# Patient Record
Sex: FEMALE | Race: WHITE | Employment: FULL TIME | URBAN - METROPOLITAN AREA
[De-identification: names, ages, dates, MRNs, and addresses within clinical notes are randomized per-mention and may not be internally consistent; named-entity substitution may affect disease eponyms.]

---

## 2017-04-18 ENCOUNTER — TRANSFERRED RECORDS (OUTPATIENT)
Dept: FAMILY MEDICINE | Facility: CLINIC | Age: 52
End: 2017-04-18

## 2017-06-17 ENCOUNTER — HEALTH MAINTENANCE LETTER (OUTPATIENT)
Age: 52
End: 2017-06-17

## 2018-01-29 ENCOUNTER — HOSPITAL ENCOUNTER (OUTPATIENT)
Dept: MAMMOGRAPHY | Facility: CLINIC | Age: 53
Discharge: HOME | End: 2018-01-29
Attending: OBSTETRICS & GYNECOLOGY | Admitting: OBSTETRICS & GYNECOLOGY
Payer: COMMERCIAL

## 2018-01-29 DIAGNOSIS — Z12.31 VISIT FOR SCREENING MAMMOGRAM: ICD-10-CM

## 2018-01-29 PROCEDURE — 77063 BREAST TOMOSYNTHESIS BI: CPT

## 2018-05-08 ENCOUNTER — TRANSFERRED RECORDS (OUTPATIENT)
Dept: FAMILY MEDICINE | Facility: CLINIC | Age: 53
End: 2018-05-08

## 2018-05-08 LAB
HBA1C MFR BLD: 5.5 % (ref 4–6)
T4 FREE SERPL-MCNC: 1.37 NG/DL (ref 0.71–1.85)
TSH SERPL-ACNC: 2.51 MCU/ML (ref 0.3–5)

## 2018-05-31 ENCOUNTER — OFFICE VISIT (OUTPATIENT)
Dept: FAMILY MEDICINE | Facility: CLINIC | Age: 53
End: 2018-05-31

## 2018-05-31 VITALS
OXYGEN SATURATION: 99 % | DIASTOLIC BLOOD PRESSURE: 62 MMHG | HEART RATE: 65 BPM | SYSTOLIC BLOOD PRESSURE: 108 MMHG | WEIGHT: 184 LBS | TEMPERATURE: 98.2 F | HEIGHT: 70 IN | BODY MASS INDEX: 26.34 KG/M2

## 2018-05-31 DIAGNOSIS — M54.12 CERVICAL RADICULOPATHY: Primary | ICD-10-CM

## 2018-05-31 PROBLEM — Z76.89 HEALTH CARE HOME: Status: ACTIVE | Noted: 2018-05-31

## 2018-05-31 PROBLEM — D68.51 FACTOR V LEIDEN MUTATION (H): Status: ACTIVE | Noted: 2018-05-31

## 2018-05-31 PROBLEM — E03.9 ACQUIRED HYPOTHYROIDISM: Status: ACTIVE | Noted: 2018-05-31

## 2018-05-31 PROCEDURE — 99213 OFFICE O/P EST LOW 20 MIN: CPT | Performed by: PHYSICIAN ASSISTANT

## 2018-05-31 RX ORDER — METHYLPREDNISOLONE 4 MG
TABLET, DOSE PACK ORAL
Qty: 21 TABLET | Refills: 0 | Status: SHIPPED | OUTPATIENT
Start: 2018-05-31 | End: 2019-10-22

## 2018-05-31 RX ORDER — LEVOTHYROXINE SODIUM 112 UG/1
TABLET ORAL
Refills: 4 | COMMUNITY
Start: 2018-05-18 | End: 2021-05-04

## 2018-05-31 NOTE — NURSING NOTE
Kristal is here for pt states she has a pinched nerve in her neck that began yesterday morning    Pre-Visit Screening :  Immunizations : up to date    Colonoscopy : is up to date  Mammogram : is up to date  Asthma Action Test/Plan : yana  PHQ9/GAD7 :  Na    Pulse - regular  My Chart - accepts    CLASSIFICATION OF OVERWEIGHT AND OBESITY BY BMI                         Obesity Class           BMI(kg/m2)  Underweight                                    < 18.5  Normal                                         18.5-24.9  Overweight                                     25.0-29.9  OBESITY                     I                  30.0-34.9                              II                 35.0-39.9  EXTREME OBESITY             III                >40                             Patient's  BMI Body mass index is 22.15 kg/(m^2).  http://hin.nhlbi.nih.gov/menuplanner/menu.cgi  Questioned patient about current smoking habits.  Pt. has never smoked.  The patient has verbalized that it is ok to leave a detailed voice message on the patient's cell phone with results/recommendations from this visit.       Verified [phone]  phone number:

## 2018-05-31 NOTE — MR AVS SNAPSHOT
"              After Visit Summary   5/31/2018    Kristal Nicole    MRN: [id number]           Patient Information     Date Of Birth          1965        Visit Information        Provider Department      5/31/2018 4:00 PM Jada Dyer PA [facility]        Today's Diagnoses     Cervical radiculopathy    -  1       Follow-ups after your visit        Who to contact     If you have questions or need follow up information about today's clinic visit or your schedule please contact [facility] directly at [phone].  Normal or non-critical lab and imaging results will be communicated to you by Sustainationhart, letter or phone within 4 business days after the clinic has received the results. If you do not hear from us within 7 days, please contact the clinic through Gen9t or phone. If you have a critical or abnormal lab result, we will notify you by phone as soon as possible.  Submit refill requests through XGear or call your pharmacy and they will forward the refill request to us. Please allow 3 business days for your refill to be completed.          Additional Information About Your Visit        MyChart Information     XGear gives you secure access to your electronic health record. If you see a primary care provider, you can also send messages to your care team and make appointments. If you have questions, please call your primary care clinic.  If you do not have a primary care provider, please call [phone] and they will assist you.        Care EveryWhere ID     This is your Care EveryWhere ID. This could be used by other organizations to access your [facility] medical records  [id number]        Your Vitals Were     Pulse Temperature Height Pulse Oximetry Breastfeeding? BMI (Body Mass Index)    65 98.2  F (36.8  C) (Oral) 1.778 m (5' 10\") 99% No 26.4 kg/m2       Blood Pressure from Last 3 Encounters:   05/31/18 108/62   06/23/08 100/64   05/08/07 " 110/72    Weight from Last 3 Encounters:   05/31/18 83.5 kg (184 lb)   06/23/08 86.2 kg (190 lb)   05/08/07 83.5 kg (184 lb)              Today, you had the following     No orders found for display         Today's Medication Changes          These changes are accurate as of 5/31/18  5:35 PM.  If you have any questions, ask your nurse or doctor.               Start taking these medicines.        Dose/Directions    methylPREDNISolone 4 MG tablet   Commonly known as:  MEDROL DOSEPAK   Used for:  Cervical radiculopathy   Started by:  Jada Dyer PA        Follow package instructions   Quantity:  21 tablet   Refills:  0            Where to get your medicines      These medications were sent to [place] [address] [place] [facility] - [address] [address]     Phone:  [phone]     methylPREDNISolone 4 MG tablet                Primary Care Provider Office Phone # Fax #    STEPHAN Guzman [phone] [address] [facility] [address]        Equal Access to Services     [facility] Center[facility]CILIA : Hadii schuyler ku hadasho Soomaali, waaxda luqadaha, qaybta kaalmada adecarol, eli sparrow . So [facility] [phone].    ATENCIÓN: Si habla español, tiene a romero disposición servicios gratuitos de asistencia lingüística. June al [phone].    We comply with applicable federal civil rights laws and [place] laws. We do not discriminate on the basis of race, color, national origin, age, disability, sex, sexual orientation, or gender identity.            Thank you!     Thank you for choosing [facility]  for your care. Our goal is always to provide you with excellent care. Hearing back from our patients is one way we can continue to improve our services. Please take a few minutes to complete the written survey that you may receive in the mail after your visit with us. Thank you!             Your  Updated Medication List - Protect others around you: Learn how to safely use, store and throw away your medicines at www.disposemymeds.org.          This list is accurate as of 5/31/18  5:35 PM.  Always use your most recent med list.                   Brand Name Dispense Instructions for use Diagnosis    levothyroxine 112 MCG tablet    SYNTHROID/LEVOTHROID     TAKE ONE TABLET BY MOUTH ONE TIME DAILY FOR 90 DAYS        methylPREDNISolone 4 MG tablet    MEDROL DOSEPAK    21 tablet    Follow package instructions    Cervical radiculopathy

## 2018-06-01 ENCOUNTER — TELEPHONE (OUTPATIENT)
Dept: FAMILY MEDICINE | Facility: CLINIC | Age: 53
End: 2018-06-01

## 2018-06-01 DIAGNOSIS — M54.12 CERVICAL RADICULOPATHY: Primary | ICD-10-CM

## 2018-06-01 RX ORDER — HYDROCODONE BITARTRATE AND ACETAMINOPHEN 5; 325 MG/1; MG/1
1 TABLET ORAL EVERY 4 HOURS PRN
Qty: 20 TABLET | Refills: 0 | Status: SHIPPED | OUTPATIENT
Start: 2018-06-01 | End: 2018-06-09

## 2018-06-01 NOTE — TELEPHONE ENCOUNTER
Medical records request to [facility].. Faxed 6/1/18. Waiting on records.

## 2018-06-01 NOTE — TELEPHONE ENCOUNTER
Ok FOR #20 Jose Alfredo  I reviewed the patient information handout from Up To Date on this medication including side effects with the patient.    Call me on Monday with update -   MRI on Monday if not improving.     Paul will  rx    Jada Dyer PA-C  6/1/2018

## 2018-06-04 ENCOUNTER — TELEPHONE (OUTPATIENT)
Dept: FAMILY MEDICINE | Facility: CLINIC | Age: 53
End: 2018-06-04

## 2018-06-04 DIAGNOSIS — M54.12 CERVICAL RADICULOPATHY: Primary | ICD-10-CM

## 2018-06-04 NOTE — TELEPHONE ENCOUNTER
Kristal called clinic support stating that she was told to follow up with Jada about her pinched nerve and she states that she isn't doing very well and would like a call back.    Pt can be reached at [phone]    Please advise

## 2018-06-05 RX ORDER — METHYLPREDNISOLONE 4 MG
TABLET, DOSE PACK ORAL
Qty: 21 TABLET | Refills: 0 | Status: SHIPPED | OUTPATIENT
Start: 2018-06-05 | End: 2019-10-22

## 2018-06-05 NOTE — TELEPHONE ENCOUNTER
Took all pills at once instead of following directions    Sat - did a lot of cleaning b/c she was feeling great.    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    Took last pill this am.  Taking fish oil  Took pain pill this am.         ADVISE NO WORKING/LIFTING.  It will not heal if she doesn't rest area.    New Medrol - FOLLOW INSTRUCTIONS    If not improving with this needs MRI    Jada Dyer PA-C  6/5/2018

## 2018-06-08 ENCOUNTER — TRANSFERRED RECORDS (OUTPATIENT)
Dept: FAMILY MEDICINE | Facility: CLINIC | Age: 53
End: 2018-06-08

## 2018-06-08 DIAGNOSIS — M54.12 CERVICAL RADICULOPATHY: ICD-10-CM

## 2018-06-08 RX ORDER — HYDROCODONE BITARTRATE AND ACETAMINOPHEN 5; 325 MG/1; MG/1
1 TABLET ORAL EVERY 4 HOURS PRN
Qty: 20 TABLET | Refills: 0 | OUTPATIENT
Start: 2018-06-08

## 2018-06-08 NOTE — TELEPHONE ENCOUNTER
Kristal is calling for more pain meds as she states she still needs it especially at night.  Last seen 5/2018    Pending Prescriptions:                       Disp   Refills    HYDROcodone-acetaminophen (NORCO) 5-325 M*20 tab*0            Sig: Take 1 tablet by mouth every 4 hours as needed           for pain    Patient's phone #[phone]

## 2018-06-08 NOTE — TELEPHONE ENCOUNTER
Denied - please tell pt she needs to be seen in clinic - have her see someone today or have her see Dr. Perez tomorrow.    Jada Dyer PA-C  6/8/2018

## 2018-06-09 ENCOUNTER — OFFICE VISIT (OUTPATIENT)
Dept: FAMILY MEDICINE | Facility: CLINIC | Age: 53
End: 2018-06-09

## 2018-06-09 VITALS
WEIGHT: 184 LBS | OXYGEN SATURATION: 99 % | HEART RATE: 66 BPM | SYSTOLIC BLOOD PRESSURE: 124 MMHG | TEMPERATURE: 98.7 F | BODY MASS INDEX: 26.34 KG/M2 | HEIGHT: 70 IN | DIASTOLIC BLOOD PRESSURE: 86 MMHG

## 2018-06-09 DIAGNOSIS — M54.12 CERVICAL RADICULOPATHY: ICD-10-CM

## 2018-06-09 PROCEDURE — 99213 OFFICE O/P EST LOW 20 MIN: CPT | Performed by: FAMILY MEDICINE

## 2018-06-09 RX ORDER — HYDROCODONE BITARTRATE AND ACETAMINOPHEN 5; 325 MG/1; MG/1
1 TABLET ORAL EVERY 4 HOURS PRN
Qty: 20 TABLET | Refills: 0 | Status: SHIPPED | OUTPATIENT
Start: 2018-06-09 | End: 2019-10-22

## 2018-06-09 NOTE — NURSING NOTE
Kristal is here for pinched nerve, pt has been having a hard time sleeping, has no improvement.      Pre-Visit Screening :  Immunizations : up to date  Colonoscopy : is up to date  Mammogram : is up to date  Asthma Action Test/Plan : yana  PHQ9/GAD7 :  Na    Pulse - regular  My Chart - accepts    CLASSIFICATION OF OVERWEIGHT AND OBESITY BY BMI                         Obesity Class           BMI(kg/m2)  Underweight                                    < 18.5  Normal                                         18.5-24.9  Overweight                                     25.0-29.9  OBESITY                     I                  30.0-34.9                              II                 35.0-39.9  EXTREME OBESITY             III                >40                             Patient's  BMI Body mass index is 22.15 kg/(m^2).  http://hin.nhlbi.nih.gov/menuplanner/menu.cgi  Questioned patient about current smoking habits.  Pt. quit smoking some time ago.    The patient has verbalized that it is ok to leave a detailed voice message on the patient's cell phone with results/recommendations from this visit.     Verified [phone]  phone number:

## 2018-06-09 NOTE — PROGRESS NOTES
"SUBJECTIVE:   Kristal Nicole is a 52 year old female who complains of neck pain for 17 days. Saw chiro initially for left neck pain and was told had \"rib out\"-adjusted but then pain moved to other (right)side. She then had some pain down right arm-was seen here and started on Medrol pack, iced. Pt also prescribed vicodin which helped some.  She then returned to activities and caused symptoms to worsen again.  Pt has been trying to avoid exacerbating pain this week but not going away, vicodin only takes the edge off. The pain is slightly positional with movement of neck with radiation of pain down the right arm. Mechanism of injury: NKI.  Symptoms have been unchanged since that time. Prior history of neck problems: recurrent self limited episodes of neck pain in the past. There is numbness, tingling, weakness in the right arm.    Patient Active Problem List   Diagnosis     CARDIOVASCULAR SCREENING; LDL GOAL LESS THAN 160     Health Care Home     Acquired hypothyroidism     Factor V Leiden mutation (H) homozygous     Past Medical History:   Diagnosis Date     NONSPECIFIC MEDICAL HISTORY     , given up for adoption     Family History   Problem Relation Age of Onset     Breast Cancer Mother 56     Cardiovascular Mother       blood clot leg after prolonged bedrest     Osteoarthritis Mother      hip     Cardiovascular Father      blood clots, legs     CANCER Father      skin      Other - See Comments Brother      Blood Clot - Factor V     Social History     Social History     Marital status:      Spouse name: Dhaval     Number of children: 0     Years of education: N/A     Occupational History      [employer]     Social History Main Topics     Smoking status: Former Smoker     Packs/day: 0.75     Years: 10.00     Quit date: 2000     Smokeless tobacco: Never Used     Alcohol use 1.8 oz/week     3 Standard drinks or equivalent per week     Drug use: No     Sexual activity: Yes     Partners: Male     " "Birth control/ protection: IUD     Other Topics Concern      Service No     Blood Transfusions No     Caffeine Concern Yes     few cups/day coffee and tea     Special Diet No     calcium daily, monitoring, dairy per day 2-3     Exercise Yes     swim, pilates, walking, 3day walk training     Bike Helmet No     Seat Belt Yes     Self-Exams Yes     Social History Narrative     Past Surgical History:   Procedure Laterality Date     D & C  6/09    miscarriage       Current Outpatient Prescriptions on File Prior to Visit:  HYDROcodone-acetaminophen (NORCO) 5-325 MG per tablet Take 1 tablet by mouth every 4 hours as needed for pain   levothyroxine (SYNTHROID/LEVOTHROID) 112 MCG tablet TAKE ONE TABLET BY MOUTH ONE TIME DAILY FOR 90 DAYS   methylPREDNISolone (MEDROL DOSEPAK) 4 MG tablet Follow package instructions   methylPREDNISolone (MEDROL DOSEPAK) 4 MG tablet Follow package instructions     No current facility-administered medications on file prior to visit.      Allergies: Penicillin [penicillins]    Immunization History   Administered Date(s) Administered     TDAP Vaccine (Adacel) 05/08/2007         OBJECTIVE:  Vital signs as noted above. Patient appears to be in mild to moderate pain.   Neck exam: no tenderness over spinous processes, right tenderness over lower cervical spine and nuchal area.Equivocal Spurlings test on left causing symptoms to right UE    Pt with relative weakness to right   and wrist flexion/extension compared to left  X-Ray: MRI ordered.    ASSESSMENT:   Right cervical radiculopathy with equivocal Spurlings test-minimal response to steroids, nsaids, rest-using vicodin to \"take the edge off\"    PLAN:ok for one further vicodin refill, I reviewed the risks, benefits, and possible side effects of the medication.  The patient had an opportunity to ask any questions regarding the treatment plan. The patient was encouraged to call my office if any problems.   MRI obtained  Consider Physical " Therapy and spine eval if not improving.   Call or return to clinic prn if these symptoms worsen or fail to improve as anticipated.

## 2018-06-09 NOTE — MR AVS SNAPSHOT
After Visit Summary   6/9/2018    Kristal Nicole    MRN: [id number]           Patient Information     Date Of Birth          1965        Visit Information        Provider Department      6/9/2018 10:00 AM David Perez MD [facility]        Today's Diagnoses     Cervical radiculopathy           Follow-ups after your visit        Additional Services     RADIOLOGY REFERRAL       Your provider has referred you to: N: [facility] [place] [phone]   [email or web address]    Please be aware that coverage of these services is subject to the terms and limitations of your health insurance plan.  Call member services at your health plan with any benefit or coverage questions.      Please bring the following to your appointment:    >>   Any x-rays, CTs or MRIs which have been performed.  Contact the facility where they were done to arrange for  prior to your scheduled appointment.    >>   List of current medications   >>   This referral request   >>   Any documents/labs given to you for this referral    Prior authorization is required for MRI/MRA, CT, Dexa Scans and Worker's Compensation cases.                  Future tests that were ordered for you today     Open Future Orders        Priority Expected Expires Ordered    MR Cervical Spine w/o Contrast Routine  6/9/2019 6/9/2018            Who to contact     If you have questions or need follow up information about today's clinic visit or your schedule please contact [facility] directly at [phone].  Normal or non-critical lab and imaging results will be communicated to you by MyChart, letter or phone within 4 business days after the clinic has received the results. If you do not hear from us within 7 days, please contact the clinic through MyChart or phone. If you have a critical or abnormal lab result, we will notify you by phone as soon as possible.  Submit refill  "requests through ElementsLocal or call your pharmacy and they will forward the refill request to us. Please allow 3 business days for your refill to be completed.          Additional Information About Your Visit        CoffeeTablehart Information     ElementsLocal gives you secure access to your electronic health record. If you see a primary care provider, you can also send messages to your care team and make appointments. If you have questions, please call your primary care clinic.  If you do not have a primary care provider, please call [phone] and they will assist you.        Care EveryWhere ID     This is your Care EveryWhere ID. This could be used by other organizations to access your [facility] medical records  [id number]        Your Vitals Were     Pulse Temperature Height Pulse Oximetry Breastfeeding? BMI (Body Mass Index)    66 98.7  F (37.1  C) (Oral) 1.778 m (5' 10\") 99% No 26.4 kg/m2       Blood Pressure from Last 3 Encounters:   06/09/18 124/86   05/31/18 108/62   06/23/08 100/64    Weight from Last 3 Encounters:   06/09/18 83.5 kg (184 lb)   05/31/18 83.5 kg (184 lb)   06/23/08 86.2 kg (190 lb)              We Performed the Following     RADIOLOGY REFERRAL          Where to get your medicines      Some of these will need a paper prescription and others can be bought over the counter.  Ask your nurse if you have questions.     Bring a paper prescription for each of these medications     HYDROcodone-acetaminophen 5-325 MG per tablet         Information about OPIOIDS     PRESCRIPTION OPIOIDS: WHAT YOU NEED TO KNOW   You have a prescription for an opioid (narcotic) pain medicine. Opioids can cause addiction. If you have a history of chemical dependency of any type, you are at a higher risk of becoming addicted to opioids. Only take this medicine after all other options have been tried. Take it for as short a time and as few doses as possible.     Do not:    Drive. If you drive while taking these medicines, you could be " arrested for driving under the influence (DUI).    Operate heavy machinery    Do any other dangerous activities while taking these medicines.     Drink any alcohol while taking these medicines.      Take with any other medicines that contain acetaminophen. Read all labels carefully. Look for the word  acetaminophen  or  Tylenol.  Ask your pharmacist if you have questions or are unsure.    Store your pills in a secure place, locked if possible. We will not replace any lost or stolen medicine. If you don t finish your medicine, please throw away (dispose) as directed by your pharmacist. The [place] Pollution Control Agency has more information about safe disposal: https://www.pca.[facility].mn.us/living-green/managing-unwanted-medications    All opioids tend to cause constipation. Drink plenty of water and eat foods that have a lot of fiber, such as fruits, vegetables, prune juice, apple juice and high-fiber cereal. Take a laxative (Miralax, milk of magnesia, Colace, Senna) if you don t move your bowels at least every other day.          Primary Care Provider Office Phone # Fax #    STEPHAN Guzman [phone]       [address]  [facility] [address]        Equal Access to Services     ZAIN JACKSON : Hadii aad ku hadasho Soomaali, waaxda luqadaha, qaybta kaalmada adeegyada, eli morris. So [facility] [phone].    ATENCIÓN: Si habla español, tiene a romero disposición servicios gratuitos de asistencia lingüística. June al [phone].    We comply with applicable federal civil rights laws and [place] laws. We do not discriminate on the basis of race, color, national origin, age, disability, sex, sexual orientation, or gender identity.            Thank you!     Thank you for choosing [facility]  for your care. Our goal is always to provide you with excellent care. Hearing back from our patients is one way we can continue to improve our  services. Please take a few minutes to complete the written survey that you may receive in the mail after your visit with us. Thank you!             Your Updated Medication List - Protect others around you: Learn how to safely use, store and throw away your medicines at www.disposemymeds.org.          This list is accurate as of 6/9/18 11:40 AM.  Always use your most recent med list.                   Brand Name Dispense Instructions for use Diagnosis    HYDROcodone-acetaminophen 5-325 MG per tablet    NORCO    20 tablet    Take 1 tablet by mouth every 4 hours as needed for pain    Cervical radiculopathy       levothyroxine 112 MCG tablet    SYNTHROID/LEVOTHROID     TAKE ONE TABLET BY MOUTH ONE TIME DAILY FOR 90 DAYS        * methylPREDNISolone 4 MG tablet    MEDROL DOSEPAK    21 tablet    Follow package instructions    Cervical radiculopathy       * methylPREDNISolone 4 MG tablet    MEDROL DOSEPAK    21 tablet    Follow package instructions    Cervical radiculopathy       * Notice:  This list has 2 medication(s) that are the same as other medications prescribed for you. Read the directions carefully, and ask your doctor or other care provider to review them with you.

## 2018-06-12 PROBLEM — Z86.0100 HISTORY OF COLONIC POLYPS: Status: ACTIVE | Noted: 2018-06-12

## 2018-06-18 ENCOUNTER — TRANSFERRED RECORDS (OUTPATIENT)
Dept: FAMILY MEDICINE | Facility: CLINIC | Age: 53
End: 2018-06-18

## 2018-06-20 ENCOUNTER — TELEPHONE (OUTPATIENT)
Dept: FAMILY MEDICINE | Facility: CLINIC | Age: 53
End: 2018-06-20

## 2018-06-20 NOTE — TELEPHONE ENCOUNTER
D/w pt MRI results- no clear nerve impingements but pt states she is no better-cant feel hand, losing strength    Recommend she see spine    She wants to call around and see how much it will cost    texted her [facility] and [facility] numbers

## 2018-06-20 NOTE — TELEPHONE ENCOUNTER
Kristal called clinic support regarding her recent MRI results.  Pt states that she cant access her mychart and wasn't able to see her results.  I read the letter and what was recommended for her and the area to focus on.   Pt still has questions and is wanting a direct referral of who to call and where to go.  States that she cant be a wife, mother, and that this has been debilitating and she is just looking for exactly who to see and what to do.    Dr Perez can you please call pt with who to see and advise? Phone number [phone]    Thank you

## 2018-07-10 ENCOUNTER — TRANSFERRED RECORDS (OUTPATIENT)
Dept: FAMILY MEDICINE | Facility: CLINIC | Age: 53
End: 2018-07-10

## 2019-10-01 ENCOUNTER — HEALTH MAINTENANCE LETTER (OUTPATIENT)
Age: 54
End: 2019-10-01

## 2019-10-22 ENCOUNTER — OFFICE VISIT (OUTPATIENT)
Dept: FAMILY MEDICINE | Facility: CLINIC | Age: 54
End: 2019-10-22

## 2019-10-22 VITALS
HEART RATE: 62 BPM | DIASTOLIC BLOOD PRESSURE: 68 MMHG | BODY MASS INDEX: 24.97 KG/M2 | WEIGHT: 174 LBS | TEMPERATURE: 98.5 F | SYSTOLIC BLOOD PRESSURE: 118 MMHG | OXYGEN SATURATION: 97 %

## 2019-10-22 DIAGNOSIS — M54.6 ACUTE LEFT-SIDED THORACIC BACK PAIN: Primary | ICD-10-CM

## 2019-10-22 LAB
ALBUMIN (URINE): ABNORMAL MG/DL
APPEARANCE UR: ABNORMAL
BACTERIA, UR: ABNORMAL
BILIRUB UR QL: ABNORMAL
BUN SERPL-MCNC: 12 MG/DL (ref 7–25)
BUN/CREATININE RATIO: 15.6 (ref 6–22)
CALCIUM SERPL-MCNC: 9.4 MG/DL (ref 8.6–10.3)
CASTS/LPF: ABNORMAL
CHLORIDE SERPLBLD-SCNC: 100.4 MMOL/L (ref 98–110)
CO2 SERPL-SCNC: 28.7 MMOL/L (ref 20–32)
COLOR UR: YELLOW
CREAT SERPL-MCNC: 0.77 MG/DL (ref 0.7–1.18)
EP/HPF: ABNORMAL
ERYTHROCYTE [DISTWIDTH] IN BLOOD BY AUTOMATED COUNT: 11.7 %
GLUCOSE SERPL-MCNC: 155 MG/DL (ref 60–99)
GLUCOSE URINE: ABNORMAL MG/DL
HCT VFR BLD AUTO: 43.3 % (ref 35–47)
HEMOGLOBIN: 14.1 G/DL (ref 11.7–15.7)
HGB UR QL: ABNORMAL
KETONES UR QL: ABNORMAL MG/DL
LEUKOCYTE ESTERASE - QUEST: ABNORMAL
MCH RBC QN AUTO: 32.3 PG (ref 26–33)
MCHC RBC AUTO-ENTMCNC: 32.6 G/DL (ref 31–36)
MCV RBC AUTO: 99.1 FL (ref 78–100)
MISC.: ABNORMAL
NITRITE UR QL STRIP: ABNORMAL
PH UR STRIP: 7 PH (ref 5–7)
PLATELET COUNT - QUEST: 182 10^9/L (ref 150–375)
POTASSIUM SERPL-SCNC: 4.62 MMOL/L (ref 3.5–5.3)
RBC # BLD AUTO: 4.37 10*12/L (ref 3.8–5.2)
RBC, UR MICRO: ABNORMAL (ref ?–2)
SODIUM SERPL-SCNC: 135.9 MMOL/L (ref 135–146)
SP. GRAVITY: 1.02
UROBILINOGEN UR QL STRIP: 0.2 EU/DL (ref 0.2–1)
WBC # BLD AUTO: 5.6 10*9/L (ref 4–11)
WBC, UR MICRO: ABNORMAL (ref ?–2)

## 2019-10-22 PROCEDURE — 85027 COMPLETE CBC AUTOMATED: CPT | Performed by: PHYSICIAN ASSISTANT

## 2019-10-22 PROCEDURE — 99213 OFFICE O/P EST LOW 20 MIN: CPT | Performed by: PHYSICIAN ASSISTANT

## 2019-10-22 PROCEDURE — 87077 CULTURE AEROBIC IDENTIFY: CPT | Mod: 90 | Performed by: PHYSICIAN ASSISTANT

## 2019-10-22 PROCEDURE — 87088 URINE BACTERIA CULTURE: CPT | Mod: 90 | Performed by: PHYSICIAN ASSISTANT

## 2019-10-22 PROCEDURE — 81001 URINALYSIS AUTO W/SCOPE: CPT | Performed by: PHYSICIAN ASSISTANT

## 2019-10-22 PROCEDURE — 87186 SC STD MICRODIL/AGAR DIL: CPT | Mod: 90 | Performed by: PHYSICIAN ASSISTANT

## 2019-10-22 PROCEDURE — 80048 BASIC METABOLIC PNL TOTAL CA: CPT | Performed by: PHYSICIAN ASSISTANT

## 2019-10-22 PROCEDURE — 36415 COLL VENOUS BLD VENIPUNCTURE: CPT | Performed by: PHYSICIAN ASSISTANT

## 2019-10-22 PROCEDURE — 87086 URINE CULTURE/COLONY COUNT: CPT | Mod: 90 | Performed by: PHYSICIAN ASSISTANT

## 2019-10-22 RX ORDER — TRAMADOL HYDROCHLORIDE 50 MG/1
50 TABLET ORAL EVERY 6 HOURS PRN
Qty: 15 TABLET | Refills: 0 | Status: SHIPPED | OUTPATIENT
Start: 2019-10-22 | End: 2019-10-25

## 2019-10-22 RX ORDER — CYCLOBENZAPRINE HCL 5 MG
5-10 TABLET ORAL 3 TIMES DAILY PRN
Qty: 30 TABLET | Refills: 0 | Status: SHIPPED | OUTPATIENT
Start: 2019-10-22 | End: 2021-05-04

## 2019-10-22 RX ORDER — VALACYCLOVIR HYDROCHLORIDE 1 G/1
1000 TABLET, FILM COATED ORAL 3 TIMES DAILY
Qty: 21 TABLET | Refills: 0 | Status: SHIPPED | OUTPATIENT
Start: 2019-10-22 | End: 2021-05-04

## 2019-10-22 NOTE — NURSING NOTE
Chief Complaint   Patient presents with     Consult     left side pain, kidney infection or ribs?, started yesterday, impossible to sleep, sit still     Pre-visit Screening:  Immunizations:  up to date  Colonoscopy:  is up to date  Mammogram: is up to date  Asthma Action Test/Plan:  NA  PHQ9:  NA  GAD7:  NA  Questioned patient about current smoking habits Pt. quit smoking some time ago.  Ok to leave detailed message on voice mail for today's visit only yes, phone # [phone]

## 2019-10-22 NOTE — PROGRESS NOTES
"CC: Back pain    History:  Last night, around 9 PM, Kristal started noticed a muscle ache type pain in her left back. Woke up a couple hours later the pain was worse and feel like a knife stabbing her. Took Tylenol, which didn't really help. Took Tylenol around 6 AM today and this only helped somewhat. Feels mildly nauseated. Pain has stayed in the same place- not traveling down. Rates it as 8/10 pain with 10 being the worst. Has noticed a different urine odor, but no other urinary symptoms- dysuria, frequency, blood. Feeling sweats and chills.     No history of kidney stones, kidney infection. Does recall a somewhat similar pain when \"one of my ribs went out\" after a powerful cough. This time, there was no cough, sneeze, trauma.     PMH, MEDICATIONS, ALLERGIES, SOCIAL AND FAMILY HISTORY in EPIC and reviewed by me personally.    ROS negative other than the symptoms noted above in the HPI.      Examination   /68 (BP Location: Right arm, Patient Position: Sitting, Cuff Size: Adult Large)   Pulse 62   Temp 98.5  F (36.9  C) (Oral)   Wt 78.9 kg (174 lb)   SpO2 97%   BMI 24.97 kg/m         Constitutional: Sitting comfortably, in no acute distress. Vital signs noted  Eyes: pupils equal round reactive to light and accomodation, extra ocular movements intact. Sclera white, conjunctiva pink.  Mouth and throat: without erythema or lesions of the mucosa  Neck:  no adenopathy, trachea midline and normal to palpation  Cardiovascular:  regular rate and rhythm, no murmurs, clicks, or gallops  Respiratory:  normal respiratory rate and rhythm, lungs clear to auscultation  Abdomen: Abdomen soft, non-tender. BS normal. No masses, organomegaly  M/S: No CVA tenderness. Mild tenderness to palpation at T10 level on left side  SKIN: No jaundice/pallor/rash.   Psychiatric: mentation appears normal and affect normal/bright        A/P    ICD-10-CM    1. Acute left-sided thoracic back pain M54.6 HCL  Urinalysis, Routine ([facility])     " Urine Culture Aerobic Bacterial     VENOUS COLLECTION     HEMOGRAM/PLATELET ([facility])     Basic Metabolic Panel ([facility])     cyclobenzaprine (FLEXERIL) 5 MG tablet     traMADol (ULTRAM) 50 MG tablet     valACYclovir (VALTREX) 1000 mg tablet       DISCUSSION:  Reassured by normal urine sample today. Urine culture pending to completely rule out infection but won't be back until Thursday.     Will check blood counts, kidney function to ensure normal.    I suspect this is significant muscle strain, but cannot rule out early shingles, so monitor closely for a rash.     For muscles strain/pain:  -Advised heating/icing 2-3 times daily for 15-20 minutes.  -Take naproxen/Aleve 1-2 tablets twice daily WITH FOOD.   -Recommended trial of cyclobenzaprine (Flexaril) muscle relaxant that pt can take up to 3 times daily. Warned them of possible side effects, including drowsiness, and no driving if drowsy.   -Will prescribe tramadol to use ONLY AS NEEDED, and do take with cyclobenzaprine, as you become dangerously drowsy. No driving. Take with food. Warned of addictive potential, diarrhea.   - Prescribed 7 day course of antiviral valacylovir (Valtrex) therapy. She should start this immediately if rash develops. She should take this 1 tablet 3 times daily with or without food. Warned of potential side effects.     I will contact pt tomorrow for update. Contact me with any worsening, but consider ER if significantly worse.     follow up visit: As needed    Sherlyn Velazquez PA-C  [facility]     04-Oct-2017 03:24

## 2019-10-22 NOTE — PATIENT INSTRUCTIONS
Reassured by normal urine sample today. Urine pending but won't be back until Thursday.     Will check blood counts, kidney function to ensure normal.    I suspect this is significant muscle strain, but cannot rule out early shingles, so monitor closely for a rash.     For muscles:  -Advised heating/icing 2-3 times daily for 15-20 minutes.  -Take naproxen/Aleve 1-2 tablets twice daily WITH FOOD.   -Recommended trial of cyclobenzaprine (Flexaril) muscle relaxant that pt can take up to 3 times daily. Warned them of possible side effects, including drowsiness, and no driving if drowsy.   -Will prescribe tramadol to use ONLY AS NEEDED, and do take with cyclobenzaprine, as you become dangerously drowsy. No driving. Take with food.   - Prescribed 7 day course of antiviral valacylovir (Valtrex) therapy. She should take this 1 tablet 3 times daily with or without food. Warned of potential side effects.     I will contact you tomorrow for update. Contact me with any worsening, but consider ER if significantly worse.

## 2019-10-23 ENCOUNTER — TELEPHONE (OUTPATIENT)
Dept: FAMILY MEDICINE | Facility: CLINIC | Age: 54
End: 2019-10-23

## 2019-10-23 NOTE — TELEPHONE ENCOUNTER
Called and left message for Kristal with normal labs results. Asked her for update. Warned her we have clinic meeting today then I'm out of office until tomorrow morning, so may not be able to get back to her until tomorrow.

## 2019-10-24 ENCOUNTER — TELEPHONE (OUTPATIENT)
Dept: FAMILY MEDICINE | Facility: CLINIC | Age: 54
End: 2019-10-24

## 2019-10-24 DIAGNOSIS — N30.00 ACUTE CYSTITIS WITHOUT HEMATURIA: Primary | ICD-10-CM

## 2019-10-24 RX ORDER — CIPROFLOXACIN 500 MG/1
500 TABLET, FILM COATED ORAL 2 TIMES DAILY
Qty: 14 TABLET | Refills: 0 | Status: SHIPPED | OUTPATIENT
Start: 2019-10-24 | End: 2019-10-31

## 2019-10-24 NOTE — TELEPHONE ENCOUNTER
Called and left message for Kristal informing her of bacteria in culture. Will send through ciprofloxacin. Instructed on use. Warned of side effects, including possibly tendon injury. Take with food. I will contact her again later today or tomorrow with final culture result.

## 2019-10-24 NOTE — TELEPHONE ENCOUNTER
Called and spoke to Kristal. Had gone to chiropractor who put 3 ribs back into place which helped with symptoms. Did start antibiotic today. Will call her again tomorrow with final culture result.

## 2019-10-25 ENCOUNTER — TELEPHONE (OUTPATIENT)
Dept: FAMILY MEDICINE | Facility: CLINIC | Age: 54
End: 2019-10-25

## 2019-10-25 DIAGNOSIS — N30.00 ACUTE CYSTITIS WITHOUT HEMATURIA: Primary | ICD-10-CM

## 2019-10-25 LAB — URINE VOIDED CULTURE: ABNORMAL

## 2019-10-25 RX ORDER — CEPHALEXIN 500 MG/1
500 CAPSULE ORAL 2 TIMES DAILY
Qty: 20 CAPSULE | Refills: 0 | Status: SHIPPED | OUTPATIENT
Start: 2019-10-25 | End: 2019-11-04

## 2019-10-25 NOTE — TELEPHONE ENCOUNTER
Called and spoke to Kristal. Informed of  results. Will switch to Keflex and she can stop cipro given resistance. Warned her of side effects. Take with food. Stop and contact me immediately with any concerns.

## 2019-10-29 ENCOUNTER — TELEPHONE (OUTPATIENT)
Dept: FAMILY MEDICINE | Facility: CLINIC | Age: 54
End: 2019-10-29

## 2019-10-29 NOTE — TELEPHONE ENCOUNTER
Called and left message for Kristal. Explained that symptoms should be gradually improving, but may not be back to normal until closer to 7-10 days of medication. Asked her to call back with more details or MyChart me if she has specific questions.

## 2019-10-29 NOTE — TELEPHONE ENCOUNTER
TORRES Giraldo  Would like to talk with you about the kidney inf you have been dealing with since last week.    Please call patient [phone]

## 2019-11-11 ENCOUNTER — APPOINTMENT (OUTPATIENT)
Dept: CT IMAGING | Facility: CLINIC | Age: 54
End: 2019-11-11
Attending: EMERGENCY MEDICINE
Payer: COMMERCIAL

## 2019-11-11 ENCOUNTER — HOSPITAL ENCOUNTER (EMERGENCY)
Facility: CLINIC | Age: 54
Discharge: HOME | End: 2019-11-11
Attending: EMERGENCY MEDICINE | Admitting: EMERGENCY MEDICINE
Payer: COMMERCIAL

## 2019-11-11 VITALS
TEMPERATURE: 97.2 F | DIASTOLIC BLOOD PRESSURE: 63 MMHG | BODY MASS INDEX: 24.62 KG/M2 | RESPIRATION RATE: 18 BRPM | WEIGHT: 172 LBS | SYSTOLIC BLOOD PRESSURE: 117 MMHG | HEART RATE: 52 BPM | HEIGHT: 70 IN | OXYGEN SATURATION: 95 %

## 2019-11-11 DIAGNOSIS — R10.9 ACUTE LEFT FLANK PAIN: ICD-10-CM

## 2019-11-11 LAB
ALBUMIN SERPL-MCNC: 3.9 G/DL (ref 3.4–5)
ALBUMIN UR-MCNC: 20 MG/DL
ALP SERPL-CCNC: 51 U/L (ref 40–150)
ALT SERPL W P-5'-P-CCNC: 35 U/L (ref 0–50)
ANION GAP SERPL CALCULATED.3IONS-SCNC: 5 MMOL/L (ref 3–14)
APPEARANCE UR: CLEAR
AST SERPL W P-5'-P-CCNC: 18 U/L (ref 0–45)
BASOPHILS # BLD AUTO: 0.1 10E9/L (ref 0–0.2)
BASOPHILS NFR BLD AUTO: 1 %
BILIRUB SERPL-MCNC: 0.7 MG/DL (ref 0.2–1.3)
BILIRUB UR QL STRIP: NEGATIVE
BUN SERPL-MCNC: 16 MG/DL (ref 7–30)
CALCIUM SERPL-MCNC: 8.7 MG/DL (ref 8.5–10.1)
CHLORIDE SERPL-SCNC: 106 MMOL/L (ref 94–109)
CO2 SERPL-SCNC: 30 MMOL/L (ref 20–32)
COLOR UR AUTO: YELLOW
CREAT SERPL-MCNC: 0.72 MG/DL (ref 0.52–1.04)
DIFFERENTIAL METHOD BLD: NORMAL
EOSINOPHIL # BLD AUTO: 0.3 10E9/L (ref 0–0.7)
EOSINOPHIL NFR BLD AUTO: 5.8 %
ERYTHROCYTE [DISTWIDTH] IN BLOOD BY AUTOMATED COUNT: 12 % (ref 10–15)
GFR SERPL CREATININE-BSD FRML MDRD: >90 ML/MIN/{1.73_M2}
GLUCOSE SERPL-MCNC: 156 MG/DL (ref 70–99)
GLUCOSE UR STRIP-MCNC: NEGATIVE MG/DL
HCT VFR BLD AUTO: 42.2 % (ref 35–47)
HGB BLD-MCNC: 14.1 G/DL (ref 11.7–15.7)
HGB UR QL STRIP: NEGATIVE
IMM GRANULOCYTES # BLD: 0 10E9/L (ref 0–0.4)
IMM GRANULOCYTES NFR BLD: 0.2 %
KETONES UR STRIP-MCNC: NEGATIVE MG/DL
LEUKOCYTE ESTERASE UR QL STRIP: NEGATIVE
LIPASE SERPL-CCNC: 87 U/L (ref 73–393)
LYMPHOCYTES # BLD AUTO: 2.2 10E9/L (ref 0.8–5.3)
LYMPHOCYTES NFR BLD AUTO: 42.8 %
MCH RBC QN AUTO: 32.1 PG (ref 26.5–33)
MCHC RBC AUTO-ENTMCNC: 33.4 G/DL (ref 31.5–36.5)
MCV RBC AUTO: 96 FL (ref 78–100)
MONOCYTES # BLD AUTO: 0.3 10E9/L (ref 0–1.3)
MONOCYTES NFR BLD AUTO: 5.6 %
MUCOUS THREADS #/AREA URNS LPF: PRESENT /LPF
NEUTROPHILS # BLD AUTO: 2.3 10E9/L (ref 1.6–8.3)
NEUTROPHILS NFR BLD AUTO: 44.6 %
NITRATE UR QL: NEGATIVE
NRBC # BLD AUTO: 0 10*3/UL
NRBC BLD AUTO-RTO: 0 /100
PH UR STRIP: 7.5 PH (ref 5–7)
PLATELET # BLD AUTO: 222 10E9/L (ref 150–450)
POTASSIUM SERPL-SCNC: 3.7 MMOL/L (ref 3.4–5.3)
PROT SERPL-MCNC: 7.3 G/DL (ref 6.8–8.8)
RBC # BLD AUTO: 4.39 10E12/L (ref 3.8–5.2)
RBC #/AREA URNS AUTO: 1 /HPF (ref 0–2)
SODIUM SERPL-SCNC: 141 MMOL/L (ref 133–144)
SOURCE: ABNORMAL
SP GR UR STRIP: 1.01 (ref 1–1.03)
SQUAMOUS #/AREA URNS AUTO: 1 /HPF (ref 0–1)
UROBILINOGEN UR STRIP-MCNC: NORMAL MG/DL (ref 0–2)
WBC # BLD AUTO: 5.2 10E9/L (ref 4–11)
WBC #/AREA URNS AUTO: 1 /HPF (ref 0–5)

## 2019-11-11 PROCEDURE — 96361 HYDRATE IV INFUSION ADD-ON: CPT

## 2019-11-11 PROCEDURE — 85025 COMPLETE CBC W/AUTO DIFF WBC: CPT | Performed by: EMERGENCY MEDICINE

## 2019-11-11 PROCEDURE — 25000128 H RX IP 250 OP 636: Performed by: EMERGENCY MEDICINE

## 2019-11-11 PROCEDURE — 74177 CT ABD & PELVIS W/CONTRAST: CPT

## 2019-11-11 PROCEDURE — 25000125 ZZHC RX 250: Performed by: EMERGENCY MEDICINE

## 2019-11-11 PROCEDURE — 81001 URINALYSIS AUTO W/SCOPE: CPT | Performed by: EMERGENCY MEDICINE

## 2019-11-11 PROCEDURE — 25800030 ZZH RX IP 258 OP 636: Performed by: EMERGENCY MEDICINE

## 2019-11-11 PROCEDURE — 96375 TX/PRO/DX INJ NEW DRUG ADDON: CPT

## 2019-11-11 PROCEDURE — 83690 ASSAY OF LIPASE: CPT | Performed by: EMERGENCY MEDICINE

## 2019-11-11 PROCEDURE — 96376 TX/PRO/DX INJ SAME DRUG ADON: CPT

## 2019-11-11 PROCEDURE — 96374 THER/PROPH/DIAG INJ IV PUSH: CPT | Mod: 59

## 2019-11-11 PROCEDURE — 99285 EMERGENCY DEPT VISIT HI MDM: CPT | Mod: 25

## 2019-11-11 PROCEDURE — 80053 COMPREHEN METABOLIC PANEL: CPT | Performed by: EMERGENCY MEDICINE

## 2019-11-11 RX ORDER — OXYCODONE HYDROCHLORIDE 5 MG/1
5 TABLET ORAL EVERY 6 HOURS PRN
Qty: 12 TABLET | Refills: 0 | Status: SHIPPED | OUTPATIENT
Start: 2019-11-11 | End: 2021-05-04

## 2019-11-11 RX ORDER — SODIUM CHLORIDE 9 MG/ML
1000 INJECTION, SOLUTION INTRAVENOUS CONTINUOUS
Status: DISCONTINUED | OUTPATIENT
Start: 2019-11-11 | End: 2019-11-11 | Stop reason: HOSPADM

## 2019-11-11 RX ORDER — KETOROLAC TROMETHAMINE 15 MG/ML
15 INJECTION, SOLUTION INTRAMUSCULAR; INTRAVENOUS ONCE
Status: COMPLETED | OUTPATIENT
Start: 2019-11-11 | End: 2019-11-11

## 2019-11-11 RX ORDER — IOPAMIDOL 755 MG/ML
500 INJECTION, SOLUTION INTRAVASCULAR ONCE
Status: COMPLETED | OUTPATIENT
Start: 2019-11-11 | End: 2019-11-11

## 2019-11-11 RX ORDER — IBUPROFEN 600 MG/1
600 TABLET, FILM COATED ORAL EVERY 6 HOURS PRN
Qty: 30 TABLET | Refills: 1 | Status: SHIPPED | OUTPATIENT
Start: 2019-11-11 | End: 2021-05-04

## 2019-11-11 RX ORDER — METHOCARBAMOL 500 MG/1
500 TABLET, FILM COATED ORAL 4 TIMES DAILY PRN
Qty: 20 TABLET | Refills: 0 | Status: SHIPPED | OUTPATIENT
Start: 2019-11-11 | End: 2021-05-04

## 2019-11-11 RX ORDER — HYDROMORPHONE HYDROCHLORIDE 1 MG/ML
0.5 INJECTION, SOLUTION INTRAMUSCULAR; INTRAVENOUS; SUBCUTANEOUS
Status: COMPLETED | OUTPATIENT
Start: 2019-11-11 | End: 2019-11-11

## 2019-11-11 RX ORDER — LIDOCAINE 50 MG/G
1 PATCH TOPICAL EVERY 24 HOURS
Qty: 10 PATCH | Refills: 0 | Status: SHIPPED | OUTPATIENT
Start: 2019-11-11 | End: 2019-11-21

## 2019-11-11 RX ADMIN — SODIUM CHLORIDE 62 ML: 9 INJECTION, SOLUTION INTRAVENOUS at 09:18

## 2019-11-11 RX ADMIN — KETOROLAC TROMETHAMINE 15 MG: 15 INJECTION, SOLUTION INTRAMUSCULAR; INTRAVENOUS at 07:31

## 2019-11-11 RX ADMIN — HYDROMORPHONE HYDROCHLORIDE 0.5 MG: 1 INJECTION, SOLUTION INTRAMUSCULAR; INTRAVENOUS; SUBCUTANEOUS at 08:26

## 2019-11-11 RX ADMIN — HYDROMORPHONE HYDROCHLORIDE 0.5 MG: 1 INJECTION, SOLUTION INTRAMUSCULAR; INTRAVENOUS; SUBCUTANEOUS at 09:06

## 2019-11-11 RX ADMIN — IOPAMIDOL 86 ML: 755 INJECTION, SOLUTION INTRAVENOUS at 09:18

## 2019-11-11 RX ADMIN — SODIUM CHLORIDE 1000 ML: 9 INJECTION, SOLUTION INTRAVENOUS at 07:31

## 2019-11-11 RX ADMIN — HYDROMORPHONE HYDROCHLORIDE 0.5 MG: 1 INJECTION, SOLUTION INTRAMUSCULAR; INTRAVENOUS; SUBCUTANEOUS at 10:52

## 2019-11-11 ASSESSMENT — ENCOUNTER SYMPTOMS
COUGH: 0
FREQUENCY: 0
DYSURIA: 0
HEMATURIA: 0
SHORTNESS OF BREATH: 0
VOMITING: 0
NAUSEA: 0
FLANK PAIN: 1

## 2019-11-11 ASSESSMENT — MIFFLIN-ST. JEOR: SCORE: 1465.44

## 2019-11-11 NOTE — DISCHARGE INSTRUCTIONS
Your CT scan shows normal kidney normal spleen and normal bowels.  The pain that was triggered by turning her motion in bed as well as worse with turning and breathing is likely musculoskeletal.  Please return to the emergency room with severe increase in shortness of breath or fever.  Please follow-up with regular physician if pain continues.

## 2019-11-11 NOTE — ED TRIAGE NOTES
ABCs intact. Pt c/o L flank pain x 2 weeks. Pt has been treated for kidney infections recently. Pt c/o twisting this morning and had increased pain. Denies urinary symptoms. Denies n/v/d.

## 2019-11-11 NOTE — ED AVS SNAPSHOT
[facility] Emergency Department  [address]  [facility] [address]  Phone:  [phone]  Fax:  [phone]                                    Kristal Nicole   MRN: [id number]    Department:  [facility] Emergency Department   Date of Visit:  11/11/2019           After Visit Summary Signature Page    I have received my discharge instructions, and my questions have been answered. I have discussed any challenges I see with this plan with the nurse or doctor.    ..........................................................................................................................................  Patient/Patient Representative Signature      ..........................................................................................................................................  Patient Representative Print Name and Relationship to Patient    ..................................................               ................................................  Date                                   Time    ..........................................................................................................................................  Reviewed by Signature/Title    ...................................................              ..............................................  Date                                               Time          22EPIC Rev 08/18

## 2019-11-11 NOTE — ED PROVIDER NOTES
History     Chief Complaint:  Flank Pain     HPI  Kristal Nicole is a 54 year old female who presents to the emergency department today with flank pain. The patient states for the last few weeks she has had intermittent left flank pain since the first night she woke up to severe left flank pain. She states she went into her PCP and had a negative urine analysis but the urine culture showed some bacteria. She states she started a course of antibiotics, then switched to another antibiotic which was finished a week ago. She states the antibiotics helped but did not remove the pain, so she started another course of antibiotics on her own that was finished 3 days ago. She states she was feeling better, but woke up this morning to the pain the most severe it has ever been. She states currently it feels as if a knife is there and it worsens with deep inhalation and turning. She denies nausea, vomiting, hematuria, urinary frequency, dysuria, shortness of breath or cough.    Allergies:  Penicillin [Penicillins]     Medications:    Flexeril  Synthroid  Valtrex    Past Medical History:    Colonic polyps  Hypothyroidism    Past Surgical History:    D&C    Family History:    Mother - breast cancer, blood clot, hip osteoarthritis  Father - blood clots, skin cancer  Brother - blood clot    Social History:  The patient was accompanied to the ED with family.  Smoking Status: Former  Smokeless Tobacco: Never  Alcohol Use: Yes   Drug Use: No   PCP: Jada Dyer   Marital Status:       Review of Systems   Respiratory: Negative for cough and shortness of breath.    Gastrointestinal: Negative for nausea and vomiting.   Genitourinary: Positive for flank pain. Negative for dysuria, frequency and hematuria.   All other systems reviewed and are negative.         Physical Exam     Patient Vitals for the past 24 hrs:   BP Temp Temp src Pulse Resp SpO2 Height Weight   11/11/19 0932 -- -- -- -- -- 95 % -- --   11/11/19  "0931 117/63 -- -- 52 -- -- -- --   11/11/19 0712 -- -- -- -- -- 98 % -- --   11/11/19 0710 123/75 97.2  F (36.2  C) Oral 54 18 98 % 1.778 m (5' 10\") 78 kg (172 lb)        Physical Exam  HENT:      Head: Normocephalic.   Cardiovascular:      Rate and Rhythm: Normal rate and regular rhythm.   Pulmonary:      Effort: Pulmonary effort is normal.      Breath sounds: Normal breath sounds.   Abdominal:      General: Abdomen is flat.   Musculoskeletal:      Comments: Pain localizes to the left flank.  It seems to be it worse with turning his word as well as with breathing.  No ecchymosis or swelling noted   Skin:     General: Skin is warm.      Capillary Refill: Capillary refill takes less than 2 seconds.   Neurological:      General: No focal deficit present.      Mental Status: She is alert.   Psychiatric:         Mood and Affect: Mood normal.           Emergency Department Course     Imaging:  Radiology results were communicated with the patient who voiced understanding of the findings.    CT Abdomen pelvis with contrast:   IMPRESSION:  1. No radiodense kidney stones or hydronephrosis in either kidney.  2. An IUD is visualized in appropriate position within the endometrial  cavity.  3. Bilateral prominent pelvic vasculature, nonspecific, can be seen  with pelvic congestion syndrome, as per radiology.    Laboratory:  Laboratory results were communicated with the patient who voiced understanding of the findings.    CBC: WBC: 5.2, HGB: 14.1, PLT: 222  CMP: Glucose: 156 (H), o/w WNL (Creatinine: 0.72)  Lipase: 87  UA: pH: 7.5 (H), Protein Albumin: 20, Mucous: present, o/w Negative    Interventions:  0731 NS 1,000 mLs IV  0731 Toradol 15 mg IV  0826 Dilaudid 0.5 mg IV  0906 Dilaudid 0.5 mg IV  1052 Dilaudid 0.5 mg IV    Emergency Department Course:  Nursing notes and vitals reviewed. (7:15 AM) I performed an exam of the patient as documented above.     IV inserted. Blood drawn. This was sent to the lab for further testing, " results above.     Medicine administered as documented above.    The patient was sent for CT while in the emergency department, results above.     1000 I rechecked the patient and discussed the results of their workup thus far.     1040 I rechecked the patient.    Findings and plan explained to the Patient. Patient discharged home with instructions regarding supportive care, medications, and reasons to return. The importance of close follow-up was reviewed. The patient was prescribed ibuprofen, Lidoderm,. Robaxin and Oxycodone.     Impression & Plan       Medical Decision Making:  Patient presents with left flank pain.  Because of which is unclear clinical symptoms are suspicious for musculoskeletal due to pain worse with motion there is no history of initiated event patient is already been evaluated is asked patient with urinalysis this was repeated and negative cannot rule out kidney stone pain and therefore CT was performed with IV contrast to rule out infarction of the kidney infarction of the spleen or diverticulitis in the left splenic flexure.  CT scan is negative.  Patient's pain was managed with medications.  No primary concern for lung pathology as there is no clear shortness of breath that is pleuritic in nature patient is over 50 but not otherwise at risk for PE.  There is no clinical concerns from a cardiac perspective due to pain motion of motion.  Patient was offered reassurance medications for pain and follow-up with primary care.      Diagnosis:    ICD-10-CM    1. Acute left flank pain R10.9       Disposition:  Discharged to home.    Discharge Medications:  New Prescriptions    IBUPROFEN (ADVIL/MOTRIN) 600 MG TABLET    Take 1 tablet (600 mg) by mouth every 6 hours as needed for moderate pain    LIDOCAINE (LIDODERM) 5 % PATCH    Place 1 patch onto the skin every 24 hours for 10 days    METHOCARBAMOL (ROBAXIN) 500 MG TABLET    Take 1 tablet (500 mg) by mouth 4 times daily as needed for muscle spasms     OXYCODONE (ROXICODONE) 5 MG TABLET    Take 1 tablet (5 mg) by mouth every 6 hours as needed for pain      Scribe Disclosure:  I, Shabbir Austin, am serving as a scribe at 7:15 AM on 11/11/2019 to document services personally performed by Robb Rothman MD based on my observations and the provider's statements to me.    11/11/2019   [facility] EMERGENCY DEPARTMENT       Robb Rothman MD  11/13/19 1124

## 2021-01-15 ENCOUNTER — HEALTH MAINTENANCE LETTER (OUTPATIENT)
Age: 56
End: 2021-01-15

## 2021-05-04 ENCOUNTER — OFFICE VISIT (OUTPATIENT)
Dept: FAMILY MEDICINE | Facility: CLINIC | Age: 56
End: 2021-05-04

## 2021-05-04 VITALS
DIASTOLIC BLOOD PRESSURE: 62 MMHG | OXYGEN SATURATION: 98 % | HEIGHT: 69 IN | SYSTOLIC BLOOD PRESSURE: 108 MMHG | BODY MASS INDEX: 26.36 KG/M2 | WEIGHT: 178 LBS | TEMPERATURE: 97.2 F | HEART RATE: 76 BPM

## 2021-05-04 DIAGNOSIS — D68.51 FACTOR V LEIDEN MUTATION (H): ICD-10-CM

## 2021-05-04 DIAGNOSIS — Z12.31 ENCOUNTER FOR SCREENING MAMMOGRAM FOR BREAST CANCER: ICD-10-CM

## 2021-05-04 DIAGNOSIS — Z13.220 SCREENING FOR LIPID DISORDERS: ICD-10-CM

## 2021-05-04 DIAGNOSIS — E03.9 HYPOTHYROIDISM, UNSPECIFIED TYPE: ICD-10-CM

## 2021-05-04 DIAGNOSIS — Z00.00 ENCOUNTER FOR GENERAL MEDICAL EXAMINATION: Primary | ICD-10-CM

## 2021-05-04 DIAGNOSIS — Z13.228 SCREENING FOR METABOLIC DISORDER: ICD-10-CM

## 2021-05-04 DIAGNOSIS — Z13.0 SCREENING FOR BLOOD DISEASE: ICD-10-CM

## 2021-05-04 DIAGNOSIS — R73.01 ELEVATED FASTING GLUCOSE: ICD-10-CM

## 2021-05-04 LAB
BUN SERPL-MCNC: 7 MG/DL (ref 7–25)
BUN/CREATININE RATIO: 9.3 (ref 6–22)
CALCIUM SERPL-MCNC: 9.1 MG/DL (ref 8.6–10.3)
CHLORIDE SERPLBLD-SCNC: 103.3 MMOL/L (ref 98–110)
CHOLEST SERPL-MCNC: 183 MG/DL (ref 0–199)
CHOLEST/HDLC SERPL: 2 {RATIO} (ref 0–5)
CO2 SERPL-SCNC: 29.6 MMOL/L (ref 20–32)
CREAT SERPL-MCNC: 0.75 MG/DL (ref 0.6–1.3)
ERYTHROCYTE [DISTWIDTH] IN BLOOD BY AUTOMATED COUNT: 11.5 %
GLUCOSE SERPL-MCNC: 97 MG/DL (ref 60–99)
HBA1C MFR BLD: 5.2 % (ref 4–7)
HCT VFR BLD AUTO: 42 % (ref 35–47)
HDLC SERPL-MCNC: 78 MG/DL (ref 40–150)
HEMOGLOBIN: 13.5 G/DL (ref 11.7–15.7)
LDLC SERPL CALC-MCNC: 96 MG/DL (ref 0–130)
MCH RBC QN AUTO: 31.7 PG (ref 26–33)
MCHC RBC AUTO-ENTMCNC: 32.1 G/DL (ref 31–36)
MCV RBC AUTO: 98.7 FL (ref 78–100)
PLATELET COUNT - QUEST: 182 10^9/L (ref 150–375)
POTASSIUM SERPL-SCNC: 4.49 MMOL/L (ref 3.5–5.3)
RBC # BLD AUTO: 4.26 10*12/L (ref 3.8–5.2)
SODIUM SERPL-SCNC: 138.8 MMOL/L (ref 135–146)
TRIGL SERPL-MCNC: 44 MG/DL (ref 0–149)
WBC # BLD AUTO: 4.5 10*9/L (ref 4–11)

## 2021-05-04 PROCEDURE — 99396 PREV VISIT EST AGE 40-64: CPT | Mod: 25 | Performed by: PHYSICIAN ASSISTANT

## 2021-05-04 PROCEDURE — 83036 HEMOGLOBIN GLYCOSYLATED A1C: CPT | Performed by: PHYSICIAN ASSISTANT

## 2021-05-04 PROCEDURE — 90471 IMMUNIZATION ADMIN: CPT | Performed by: PHYSICIAN ASSISTANT

## 2021-05-04 PROCEDURE — 80061 LIPID PANEL: CPT | Performed by: PHYSICIAN ASSISTANT

## 2021-05-04 PROCEDURE — 85027 COMPLETE CBC AUTOMATED: CPT | Performed by: PHYSICIAN ASSISTANT

## 2021-05-04 PROCEDURE — 84443 ASSAY THYROID STIM HORMONE: CPT | Mod: 90 | Performed by: PHYSICIAN ASSISTANT

## 2021-05-04 PROCEDURE — 90714 TD VACC NO PRESV 7 YRS+ IM: CPT | Performed by: PHYSICIAN ASSISTANT

## 2021-05-04 PROCEDURE — 80048 BASIC METABOLIC PNL TOTAL CA: CPT | Performed by: PHYSICIAN ASSISTANT

## 2021-05-04 PROCEDURE — 36415 COLL VENOUS BLD VENIPUNCTURE: CPT | Performed by: PHYSICIAN ASSISTANT

## 2021-05-04 RX ORDER — CHLORAL HYDRATE 500 MG
2 CAPSULE ORAL DAILY
COMMUNITY
End: 2024-08-28

## 2021-05-04 RX ORDER — MULTIPLE VITAMINS W/ MINERALS TAB 9MG-400MCG
1 TAB ORAL DAILY
COMMUNITY
End: 2024-08-28

## 2021-05-04 RX ORDER — VITAMIN E 268 MG
CAPSULE ORAL DAILY
COMMUNITY
End: 2024-08-28

## 2021-05-04 RX ORDER — VITAMIN B COMPLEX
TABLET ORAL DAILY
COMMUNITY
End: 2024-08-28

## 2021-05-04 RX ORDER — LEVOTHYROXINE SODIUM 112 UG/1
112 TABLET ORAL DAILY
Qty: 90 TABLET | Refills: 3 | Status: SHIPPED | OUTPATIENT
Start: 2021-05-04 | End: 2022-06-07

## 2021-05-04 SDOH — HEALTH STABILITY: MENTAL HEALTH: HOW MANY STANDARD DRINKS CONTAINING ALCOHOL DO YOU HAVE ON A TYPICAL DAY?: 1 OR 2

## 2021-05-04 SDOH — HEALTH STABILITY: MENTAL HEALTH: HOW OFTEN DO YOU HAVE A DRINK CONTAINING ALCOHOL?: 2-3 TIMES A WEEK

## 2021-05-04 SDOH — HEALTH STABILITY: MENTAL HEALTH: HOW OFTEN DO YOU HAVE 6 OR MORE DRINKS ON ONE OCCASION?: NEVER

## 2021-05-04 ASSESSMENT — MIFFLIN-ST. JEOR: SCORE: 1471.78

## 2021-05-04 NOTE — NURSING NOTE
Kristal is here for a fasting cpx and pt needs a thyroid check.        Pre-visit Screening:  Immunizations:  not up to date - TD today and will call insurance about Shingrix  Colonoscopy:  is up to date  Mammogram: is due and ordered today  Asthma Action Test/Plan:  PURNIMA  PHQ9:  PURNIMA phq2 done today  GAD7:  PURNIMA  Questioned patient about current smoking habits Pt. quit smoking some time ago.  Ok to leave detailed message on voice mail for today's visit only Yes, phone # [phone]

## 2021-05-04 NOTE — PROGRESS NOTES
Chief Complaint:  Physical Exam    SUBJECTIVE:   Kristal Nicole is a 55 year old female presents for routine health maintenance.    Current concerns:   Has been getting more hot flashes, abdominal weight, cantu. Seeing OBGYN soon to check.     Would like to update fasting labs.    Needing refill of levothyroxine. Taking consistently.     Menses are absent    No LMP recorded. (Menstrual status: IUD).     Was last Pap smear normal: Yes  Due for mammogram:  Yes    Body mass index is 26.29 kg/m .    Present exercise habits:  3-5 times/week, weight lifting twice weekly  Present dietary habits:  eats regular meals and follows a balanced nutrition diet    Calcium intake: 2-3 servings daily plus supplement  Vit D intake: is taking supplement    Is the patient a smoker? No  If yes, smoking cessation advised and counseling provided.     Cardiovascular risk factors: previous smoker    Over the past few weeks, have you felt down or depressed? Little interest or pleasure in doing things? No concerns    If in a relationship are there any Domestic violence concern: No    Last dental appointment:  last year  Last optical appointment:   more than 3 years ago    Was the patient born between [birth year] and has not had Hep C testing?  Has not been tested, declines testing    I have reviewed the following histories: Past Medical History, Past Surgical History, Social History, Family History, Problem List, Medication List and Allergies    Past Medical History:   Diagnosis Date     NONSPECIFIC MEDICAL HISTORY     , given up for adoption     Family History   Problem Relation Age of Onset     Breast Cancer Mother 56     Cardiovascular Mother          blood clot leg after prolonged bedrest     Osteoarthritis Mother         hip     Cardiovascular Father         blood clots, legs     Cancer Father         skin      Other - See Comments Brother         Blood Clot - Factor V     Social History     Socioeconomic History     Marital  status:      Spouse name: Dhaval     Number of children: 0     Years of education: Not on file     Highest education level: Not on file   Occupational History     Employer: [employer]   Social [employer]     Financial resource strain: Not on file     Food insecurity     Worry: Not on file     Inability: Not on file     Transportation needs     Medical: Not on file     Non-medical: Not on file   Tobacco Use     Smoking status: Former Smoker     Packs/day: 0.75     Years: 10.00     Pack years: 7.50     Quit date: 2000     Years since quittin.0     Smokeless tobacco: Never Used   Substance and Sexual Activity     Alcohol use: Yes     Alcohol/week: 3.0 standard drinks     Types: 3 Standard drinks or equivalent per week     Frequency: 2-3 times a week     Drinks per session: 1 or 2     Binge frequency: Never     Drug use: No     Sexual activity: Yes     Partners: Male     Birth control/protection: I.U.D.     Comment: Mirena 16   Lifestyle     Physical activity     Days per week: Not on file     Minutes per session: Not on file     Stress: Not on file   Relationships     Social connections     Talks on phone: Not on file     Gets together: Not on file     Attends Latter day service: Not on file     Active member of club or organization: Not on file     Attends meetings of clubs or organizations: Not on file     Relationship status: Not on file     Intimate partner violence     Fear of current or ex partner: Not on file     Emotionally abused: Not on file     Physically abused: Not on file     Forced sexual activity: Not on file   Other Topics Concern      Service No     Blood Transfusions No     Caffeine Concern Yes     Comment: few cups/day coffee and tea     Occupational Exposure Not Asked     Hobby Hazards Not Asked     Sleep Concern Not Asked     Stress Concern Not Asked     Weight Concern Not Asked     Special Diet No     Comment: calcium daily, monitoring, dairy per day 2-3     Back Care Not  "Asked     Exercise Yes     Comment: swim, pilates, walking, 3day walk training     Bike Helmet No     Seat Belt Yes     Self-Exams Yes   Social History Narrative     Not on file     Past Surgical History:   Procedure Laterality Date     D & C  6/09    miscarriage       ROS:  E/M: NEGATIVE for ear, nose, mouth and throat problems  R: NEGATIVE for significant/chronic cough or SOB  CV: NEGATIVE for chest pain or palpitations  GI: NEGATIVE for abdominal pain, chronic diarrhea or constipation  :  NEGATIVE for dysuria, hematuria or vaginal discharge. No sexual health concerns.       Current Outpatient Medications   Medication     fish oil-omega-3 fatty acids 1000 MG capsule     levothyroxine (SYNTHROID/LEVOTHROID) 112 MCG tablet     multivitamin w/minerals (MULTI-VITAMIN) tablet     Vitamin D3 (CHOLECALCIFEROL) 25 mcg (1000 units) tablet     vitamin E (TOCOPHEROL) 400 units (180 mg) capsule     No current facility-administered medications for this visit.        Patient Active Problem List    Diagnosis Date Noted     History of colonic polyps - repeat colonoscopy 2022 06/12/2018     Priority: Medium     Health Care Home 05/31/2018     Priority: Medium     Acquired hypothyroidism 05/31/2018     Priority: Medium     Factor V Leiden mutation (H) homozygous 05/31/2018     Priority: Medium     CARDIOVASCULAR SCREENING; LDL GOAL LESS THAN 160 10/31/2010     Priority: Medium         OBJECTIVE:  /62 (BP Location: Right arm, Patient Position: Sitting, Cuff Size: Adult Large)   Pulse 76   Temp 97.2  F (36.2  C) (Temporal)   Ht 1.753 m (5' 9\")   Wt 80.7 kg (178 lb)   SpO2 98%   BMI 26.29 kg/m        General: 55 year old female who appears her stated age. Vital signs noted   Head: Normocephalic  Eyes: pupils equal round reactive to light and accomodation, extra ocular movements intact  Ears: external canals and TMs free of abnormalities  Nose: patent, without mucosal abnormalities  Mouth and throat: without erythema or " "lesions of the mucosa  Neck: supple, without adenopathy or thyromegaly  Lungs: clear to auscultation, no wheezing or crackles  Breasts: Goes to [facility]  Cv: regular rate and rhythm, normal s1 and s2 without murmur or click  Abd: soft, non-tender, no masses, no hepatomegaly or splenomegaly.   (female): Goes to OBGYN  Ms: normal muscle tone & symmetry  Skin: clear to inspection and with no palpable abnormalities.  Neuro: sensation and motor function grossly intact; cranial nerves without obvious abnormalities.    ASSESSMENT/PLAN:    1. Encounter for general medical examination  Kristal is doing well today. Will update fasting labs today and send results when available.     2. Encounter for screening mammogram for breast cancer  - RADIOLOGY REFERRAL  - MA Screen Bilateral w/[place]; Future    3. Hypothyroidism, unspecified type  Will recheck TSH and refill current dose of levothyroxine for 1 year, unless dose change is indicated.  - levothyroxine (SYNTHROID/LEVOTHROID) 112 MCG tablet; Take 1 tablet (112 mcg) by mouth daily  Dispense: 90 tablet; Refill: 3  - VENOUS COLLECTION  - TSH with free T4 reflex ([facility])    4. Elevated fasting glucose  - VENOUS COLLECTION  - Hemoglobin A1c ([facility])    5. Screening for lipid disorders  - VENOUS COLLECTION  - Lipid Panel ([facility])    6. Screening for blood disease  - VENOUS COLLECTION  - Hemogram Platelet ([facility])    7. Screening for metabolic disorder  - VENOUS COLLECTION  - Basic Metabolic Panel ([facility])    8. Factor V Leiden mutation (H) homozygous  Continue to monitor for signs of blood clot and contact me if noted.        reports that she quit smoking about 21 years ago. She has a 7.50 pack-year smoking history. She has never used smokeless tobacco.    Estimated body mass index is 26.29 kg/m  as calculated from the following:    Height as of this encounter: 1.753 m (5' 9\").    Weight as of this encounter: 80.7 kg (178 lb).  Weight management plan: Discussed healthy diet and exercise " guidelines    Labs pending:      Fasting glucose      Fasting lipids  Meds Suggested:      Vitamin D       Calcium  Tests Recommended:      Regular Dental Examinations        Eye exam        Mammogram yearly  Behavior Modifications:       Cardiovascular exercise 3 times per week--enough to get your Target Heart rate  Immunizations suggested:       Td  Q 10 years  Other recommendations:     BMI noted and discussed      Regular breast exam     Encouraged My Chart    Counseling Resources:  ATP IV Guidelines  Pooled Cohorts Equation Calculator  Breast Cancer Risk Calculator  FRAX Risk Assessment  ICSI Preventive Guidelines  Dietary Guidelines for Americans, 2010  [employer]'s MyPlate            Sherlyn Funk PA-C  5/4/2021

## 2021-05-05 ENCOUNTER — MYC MEDICAL ADVICE (OUTPATIENT)
Dept: FAMILY MEDICINE | Facility: CLINIC | Age: 56
End: 2021-05-05

## 2021-05-05 LAB — TSH SERPL-ACNC: 2.05 MIU/L

## 2021-05-12 ENCOUNTER — HOSPITAL ENCOUNTER (OUTPATIENT)
Dept: MAMMOGRAPHY | Facility: CLINIC | Age: 56
Discharge: HOME | End: 2021-05-12
Attending: PHYSICIAN ASSISTANT | Admitting: PHYSICIAN ASSISTANT
Payer: COMMERCIAL

## 2021-05-12 DIAGNOSIS — Z12.31 ENCOUNTER FOR SCREENING MAMMOGRAM FOR BREAST CANCER: ICD-10-CM

## 2021-05-12 PROCEDURE — 77063 BREAST TOMOSYNTHESIS BI: CPT

## 2021-09-04 ENCOUNTER — HEALTH MAINTENANCE LETTER (OUTPATIENT)
Age: 56
End: 2021-09-04

## 2022-05-02 ENCOUNTER — MYC MEDICAL ADVICE (OUTPATIENT)
Dept: FAMILY MEDICINE | Facility: CLINIC | Age: 57
End: 2022-05-02

## 2022-05-02 DIAGNOSIS — E03.9 HYPOTHYROIDISM, UNSPECIFIED TYPE: ICD-10-CM

## 2022-06-07 RX ORDER — LEVOTHYROXINE SODIUM 112 UG/1
112 TABLET ORAL DAILY
Qty: 10 TABLET | Refills: 0 | COMMUNITY
Start: 2022-06-07 | End: 2022-06-14

## 2022-06-07 NOTE — TELEPHONE ENCOUNTER
Pt called back she is wondering if she should keep her appt for 06/14/22 or if she should reschedule. She was out of her levothyroxine so she took her husbands old prescription for 125MCG for a week. Is this going to affect her blood work?     Please advise # [phone]    Thanks,Vero HARRIS

## 2022-06-11 ENCOUNTER — HEALTH MAINTENANCE LETTER (OUTPATIENT)
Age: 57
End: 2022-06-11

## 2022-06-14 ENCOUNTER — OFFICE VISIT (OUTPATIENT)
Dept: FAMILY MEDICINE | Facility: CLINIC | Age: 57
End: 2022-06-14

## 2022-06-14 VITALS
TEMPERATURE: 97.2 F | BODY MASS INDEX: 25.92 KG/M2 | OXYGEN SATURATION: 99 % | HEART RATE: 47 BPM | SYSTOLIC BLOOD PRESSURE: 106 MMHG | HEIGHT: 69 IN | WEIGHT: 175 LBS | DIASTOLIC BLOOD PRESSURE: 60 MMHG

## 2022-06-14 DIAGNOSIS — Z00.00 ENCOUNTER FOR GENERAL HEALTH EXAMINATION: Primary | ICD-10-CM

## 2022-06-14 DIAGNOSIS — Z13.0 SCREENING FOR BLOOD DISEASE: ICD-10-CM

## 2022-06-14 DIAGNOSIS — Z13.220 SCREENING FOR LIPID DISORDERS: ICD-10-CM

## 2022-06-14 DIAGNOSIS — E03.9 HYPOTHYROIDISM, UNSPECIFIED TYPE: ICD-10-CM

## 2022-06-14 DIAGNOSIS — Z13.228 SCREENING FOR METABOLIC DISORDER: ICD-10-CM

## 2022-06-14 LAB
BUN SERPL-MCNC: 12 MG/DL (ref 7–25)
BUN/CREATININE RATIO: 15.2 (ref 6–22)
CALCIUM SERPL-MCNC: 9.7 MG/DL (ref 8.6–10.3)
CHLORIDE SERPLBLD-SCNC: 101.6 MMOL/L (ref 98–110)
CHOLEST SERPL-MCNC: 202 MG/DL (ref 0–199)
CHOLEST/HDLC SERPL: 3 {RATIO} (ref 0–5)
CO2 SERPL-SCNC: 31.5 MMOL/L (ref 20–32)
CREAT SERPL-MCNC: 0.79 MG/DL (ref 0.6–1.3)
ERYTHROCYTE [DISTWIDTH] IN BLOOD BY AUTOMATED COUNT: 11.7 %
GLUCOSE SERPL-MCNC: 97 MG/DL (ref 60–99)
HCT VFR BLD AUTO: 43.1 % (ref 35–47)
HDLC SERPL-MCNC: 71 MG/DL (ref 40–150)
HEMOGLOBIN: 14.1 G/DL (ref 11.7–15.7)
LDLC SERPL CALC-MCNC: 121 MG/DL (ref 0–130)
MCH RBC QN AUTO: 32.4 PG (ref 26–33)
MCHC RBC AUTO-ENTMCNC: 32.7 G/DL (ref 31–36)
MCV RBC AUTO: 99.1 FL (ref 78–100)
PLATELET COUNT - QUEST: 213 10^9/L (ref 150–375)
POTASSIUM SERPL-SCNC: 4.02 MMOL/L (ref 3.5–5.3)
RBC # BLD AUTO: 4.35 10*12/L (ref 3.8–5.2)
SODIUM SERPL-SCNC: 139.5 MMOL/L (ref 135–146)
TRIGL SERPL-MCNC: 49 MG/DL (ref 0–149)
WBC # BLD AUTO: 3.8 10*9/L (ref 4–11)

## 2022-06-14 PROCEDURE — 99396 PREV VISIT EST AGE 40-64: CPT | Performed by: PHYSICIAN ASSISTANT

## 2022-06-14 PROCEDURE — 80048 BASIC METABOLIC PNL TOTAL CA: CPT | Performed by: PHYSICIAN ASSISTANT

## 2022-06-14 PROCEDURE — 36415 COLL VENOUS BLD VENIPUNCTURE: CPT | Performed by: PHYSICIAN ASSISTANT

## 2022-06-14 PROCEDURE — 85027 COMPLETE CBC AUTOMATED: CPT | Performed by: PHYSICIAN ASSISTANT

## 2022-06-14 PROCEDURE — 80061 LIPID PANEL: CPT | Performed by: PHYSICIAN ASSISTANT

## 2022-06-14 RX ORDER — LEVONORGESTREL 52 MG/1
INTRAUTERINE DEVICE INTRAUTERINE
COMMUNITY
End: 2023-08-22

## 2022-06-14 RX ORDER — LEVOTHYROXINE SODIUM 112 UG/1
112 TABLET ORAL DAILY
Qty: 90 TABLET | Refills: 0 | Status: CANCELLED | OUTPATIENT
Start: 2022-06-14

## 2022-06-14 RX ORDER — LEVOTHYROXINE SODIUM 112 MCG
112 TABLET ORAL DAILY
Qty: 90 TABLET | Refills: 3 | Status: SHIPPED | OUTPATIENT
Start: 2022-06-14 | End: 2023-06-26

## 2022-06-14 NOTE — PROGRESS NOTES
Chief Complaint:  Physical Exam    SUBJECTIVE:   Kristal Nicole is a 56 year old female presents for routine health maintenance.    Current concerns:   Hypothyroid:  Stable on current dose of levothyroxine 112 mcg daily for the past 6 days. Before that was taking 125 mcg for 1 week prior. Denies any symptoms of being over or under supplemented. Last thyroid blood tests 2021 and were normal without change. Takes medication in the evening right before bedtime, 2-4 hours after dinner.    Menses are absent    No LMP recorded. (Menstrual status: IUD).  Was last Pap smear normal: Yes  Due for mammogram:  Yes    Body mass index is 26.22 kg/m .    Present exercise habits:  3-5 times/week  Present dietary habits:  eats regular meals and follows a balanced nutrition diet    Calcium intake: multivitamin plus diet  Vit D intake: is taking supplement    Is the patient a smoker? No  If yes, smoking cessation advised and counseling provided.     Cardiovascular risk factors: none    Over the past few weeks, have you felt down or depressed? Little interest or pleasure in doing things? Has noticed more agitation, irritability, snapping at family for the past 6 months. Started a supplement from [employer] for the past 45 days. Not quite to the point of wanting medication.     If in a relationship are there any Domestic violence concern: No    Last dental appointment:  this year  Last optical appointment:  last year    Was the patient born between [birth year] and has not had Hep C testing?  Has not been tested, declines testing    I have reviewed the following histories: Past Medical History, Past Surgical History, Social History, Family History, Problem List, Medication List and Allergies    Past Medical History:   Diagnosis Date     NONSPECIFIC MEDICAL HISTORY     , given up for adoption     Family History   Problem Relation Age of Onset     Breast Cancer Mother 56     Cardiovascular Mother          blood clot leg  after prolonged bedrest     Osteoarthritis Mother         hip     Cardiovascular Father         blood clots, legs     Cancer Father         skin      Osteoarthritis Father      Other - See Comments Brother         Blood Clot - Factor V     Social History     Socioeconomic History     Marital status:      Spouse name: Dhaval     Number of children: 0     Years of education: Not on file     Highest education level: Not on file   Occupational History     Employer: [employer]   Tobacco Use     Smoking status: Former Smoker     Packs/day: 0.75     Years: 10.00     Pack years: 7.50     Quit date: 2000     Years since quittin.1     Smokeless tobacco: Never Used   Substance and Sexual Activity     Alcohol use: Yes     Alcohol/week: 3.0 standard drinks     Types: 3 Standard drinks or equivalent per week     Drug use: No     Sexual activity: Yes     Partners: Male     Birth control/protection: I.U.D.     Comment: Mirena 17   Other Topics Concern      Service No     Blood Transfusions No     Caffeine Concern Yes     Comment: few cups/day coffee and tea     Occupational Exposure Not Asked     Hobby Hazards Not Asked     Sleep Concern Not Asked     Stress Concern Not Asked     Weight Concern Not Asked     Special Diet No     Comment: calcium daily, monitoring, dairy per day 2-3     Back Care Not Asked     Exercise Yes     Comment: swim, pilates, walking, 3day walk training     Bike Helmet No     Seat Belt Yes     Self-Exams Yes   Social History Narrative     Not on file     Social Determinants of Health     Financial Resource Strain: Not on file   Food Insecurity: Not on file   Transportation Needs: Not on file   Physical Activity: Not on file   Stress: Not on file   Social Connections: Not on file   Intimate Partner Violence: Not on file   Housing Stability: Not on file     Past Surgical History:   Procedure Laterality Date     D & C      miscarriage       ROS:  E/M: NEGATIVE for ear, nose,  "mouth and throat problems  R: NEGATIVE for significant/chronic cough or SOB  CV: NEGATIVE for chest pain or palpitations  GI: NEGATIVE for abdominal pain, chronic diarrhea or constipation  :  NEGATIVE for dysuria, hematuria or vaginal discharge. No sexual health concerns.       Current Outpatient Medications   Medication     aspirin (ASA) 81 MG EC tablet     fish oil-omega-3 fatty acids 1000 MG capsule     levonorgestrel (MIRENA, 52 MG,) 20 MCG/DAY IUD     multivitamin w/minerals (MULTI-VITAMIN) tablet     SYNTHROID 112 MCG tablet     UNABLE TO FIND     Vitamin D3 (CHOLECALCIFEROL) 25 mcg (1000 units) tablet     vitamin E (TOCOPHEROL) 400 units (180 mg) capsule     No current facility-administered medications for this visit.       Patient Active Problem List    Diagnosis Date Noted     History of colonic polyps - repeat colonoscopy 2022 06/12/2018     Priority: Medium     Health Care Home 05/31/2018     Priority: Medium     Acquired hypothyroidism 05/31/2018     Priority: Medium     Factor V Leiden mutation (H) homozygous 05/31/2018     Priority: Medium     CARDIOVASCULAR SCREENING; LDL GOAL LESS THAN 160 10/31/2010     Priority: Medium         OBJECTIVE:  /60 (BP Location: Left arm, Patient Position: Sitting, Cuff Size: Adult Large)   Pulse (!) 47   Temp 97.2  F (36.2  C) (Temporal)   Ht 1.74 m (5' 8.5\")   Wt 79.4 kg (175 lb)   SpO2 99%   BMI 26.22 kg/m        General: 56 year old female who appears her stated age. Vital signs noted  Head: Normocephalic  Eyes: pupils equal round reactive to light and accomodation, extra ocular movements intact  Ears: external canals and TMs free of abnormalities  Nose: patent, without mucosal abnormalities  Mouth and throat: without erythema or lesions of the mucosa  Neck: supple, without adenopathy or thyromegaly  Lungs: clear to auscultation, no wheezing or crackles  Breasts: skin without rash, no dominant mass, no nipple discharge, or axillary adenopathy  Cv: " "regular rate and rhythm, normal s1 and s2 without murmur or click  Abd: soft, non-tender, no masses, no hepatomegaly or splenomegaly.   (female): normal female external genitalia, normal urethral meatus, vaginal mucosa, normal cervix/adnexa/uterus without masses or discharge  Ms: normal muscle tone & symmetry  Skin: clear to inspection and with no palpable abnormalities.  Neuro: sensation and motor function grossly intact; cranial nerves without obvious abnormalities.    ASSESSMENT/PLAN:    Encounter for general health examination  Kristal is doing well today. Will update fasting labs today, TSH, and contact with result when available. Will refill Synthroid for 1 year without change 1 year.     Hypothyroidism, unspecified type  - VENOUS COLLECTION  - TSH WITH FREE T4 REFLEX ([facility])  - SYNTHROID 112 MCG tablet; Take 1 tablet (112 mcg) by mouth daily    Screening for lipid disorders  - VENOUS COLLECTION  - Lipid Panel ([facility])    Screening for metabolic disorder  - VENOUS COLLECTION  - Basic Metabolic Panel ([facility])    Screening for blood disease  - Hemogram Platelet ([facility])     reports that she quit smoking about 22 years ago. She has a 7.50 pack-year smoking history. She has never used smokeless tobacco.    Estimated body mass index is 26.22 kg/m  as calculated from the following:    Height as of this encounter: 1.74 m (5' 8.5\").    Weight as of this encounter: 79.4 kg (175 lb).      Labs pending:      Fasting glucose      Fasting lipids  Meds Suggested:      Vitamin D       Calcium  Tests Recommended:      Regular Dental Examinations        Eye exam        Mammogram yearly  Behavior Modifications:       Cardiovascular exercise 3 times per week--enough to get your Target Heart rate  Immunizations suggested:  Other recommendations:     BMI noted and discussed      Regular breast exam     Encouraged My Chart    Counseling Resources:  ATP IV Guidelines  Pooled Cohorts Equation Calculator  Breast Cancer Risk Calculator  FRAX " Risk Assessment  ICSI Preventive Guidelines  Dietary Guidelines for Americans, 2010  USDA's MyPlate      Sherlyn Funk PA-C  6/14/2022

## 2022-06-14 NOTE — NURSING NOTE
Chief Complaint   Patient presents with     Physical     Fasting cpx, pt went to OBGYN last week for a pap         Pre-visit Screening:  Immunizations:  up to date-- will go to pharmacy for the Shingrix  Colonoscopy:  is up to date  Mammogram: is up to date  Asthma Action Test/Plan:  PURNIMA  PHQ9:  NA phq2 done today  GAD7:  PURNIMA  Questioned patient about current smoking habits Pt. quit smoking some time ago.  Ok to leave detailed message on voice mail for today's visit only Yes, phone # [phone]

## 2022-06-15 LAB — TSH SERPL-ACNC: 2.8 MIU/L

## 2022-07-05 ENCOUNTER — HOSPITAL ENCOUNTER (OUTPATIENT)
Dept: MAMMOGRAPHY | Facility: CLINIC | Age: 57
Discharge: HOME | End: 2022-07-05
Attending: PHYSICIAN ASSISTANT | Admitting: PHYSICIAN ASSISTANT
Payer: COMMERCIAL

## 2022-07-05 DIAGNOSIS — Z12.31 VISIT FOR SCREENING MAMMOGRAM: ICD-10-CM

## 2022-07-05 PROCEDURE — 77067 SCR MAMMO BI INCL CAD: CPT

## 2022-10-16 ENCOUNTER — HEALTH MAINTENANCE LETTER (OUTPATIENT)
Age: 57
End: 2022-10-16

## 2023-06-20 ENCOUNTER — MYC MEDICAL ADVICE (OUTPATIENT)
Dept: FAMILY MEDICINE | Facility: CLINIC | Age: 58
End: 2023-06-20

## 2023-06-20 DIAGNOSIS — E03.9 HYPOTHYROIDISM, UNSPECIFIED TYPE: ICD-10-CM

## 2023-06-26 RX ORDER — LEVOTHYROXINE SODIUM 112 MCG
112 TABLET ORAL DAILY
Qty: 30 TABLET | Refills: 0 | Status: SHIPPED | OUTPATIENT
Start: 2023-06-26 | End: 2023-07-31

## 2023-06-26 NOTE — TELEPHONE ENCOUNTER
Pt scheduled appt for 07/18. She is needing short supply of her Synthroid.    Kristal Nicole is requesting a refill of:    Pending Prescriptions:                       Disp   Refills    SYNTHROID 112 MCG tablet                  30 tab*0            Sig: Take 1 tablet (112 mcg) by mouth daily

## 2023-07-11 ENCOUNTER — LAB REQUISITION (OUTPATIENT)
Dept: LAB | Facility: CLINIC | Age: 58
End: 2023-07-11
Payer: COMMERCIAL

## 2023-07-11 DIAGNOSIS — N95.1 MENOPAUSAL AND FEMALE CLIMACTERIC STATES: ICD-10-CM

## 2023-07-11 PROCEDURE — 83001 ASSAY OF GONADOTROPIN (FSH): CPT | Mod: ORL | Performed by: OBSTETRICS & GYNECOLOGY

## 2023-07-12 LAB — FSH SERPL IRP2-ACNC: 49.7 MIU/ML

## 2023-07-25 ENCOUNTER — TELEPHONE (OUTPATIENT)
Dept: FAMILY MEDICINE | Facility: CLINIC | Age: 58
End: 2023-07-25

## 2023-07-25 DIAGNOSIS — E03.9 HYPOTHYROIDISM, UNSPECIFIED TYPE: ICD-10-CM

## 2023-08-01 RX ORDER — LEVOTHYROXINE SODIUM 112 MCG
112 TABLET ORAL DAILY
Qty: 18 TABLET | Refills: 0 | Status: SHIPPED | OUTPATIENT
Start: 2023-08-01 | End: 2023-08-22

## 2023-08-01 NOTE — TELEPHONE ENCOUNTER
Extended through 8/18/2023 appt. No further extensions. If she cancels again will need an appt to restart and will have to coordinate lab only appt to check levels once restarted.

## 2023-08-21 NOTE — TELEPHONE ENCOUNTER
Pt rescheduled for 9/8/23... She has received multiple extensions. She will have to wait until appt to restart levothyroxine.

## 2023-08-22 ENCOUNTER — OFFICE VISIT (OUTPATIENT)
Dept: FAMILY MEDICINE | Facility: CLINIC | Age: 58
End: 2023-08-22

## 2023-08-22 VITALS
HEART RATE: 50 BPM | BODY MASS INDEX: 26.81 KG/M2 | SYSTOLIC BLOOD PRESSURE: 108 MMHG | HEIGHT: 69 IN | TEMPERATURE: 98.2 F | OXYGEN SATURATION: 98 % | DIASTOLIC BLOOD PRESSURE: 60 MMHG | WEIGHT: 181 LBS

## 2023-08-22 DIAGNOSIS — E03.9 ACQUIRED HYPOTHYROIDISM: ICD-10-CM

## 2023-08-22 PROCEDURE — 36415 COLL VENOUS BLD VENIPUNCTURE: CPT | Performed by: PHYSICIAN ASSISTANT

## 2023-08-22 PROCEDURE — 99213 OFFICE O/P EST LOW 20 MIN: CPT | Performed by: PHYSICIAN ASSISTANT

## 2023-08-22 PROCEDURE — 84443 ASSAY THYROID STIM HORMONE: CPT | Mod: 90 | Performed by: PHYSICIAN ASSISTANT

## 2023-08-22 RX ORDER — ESCITALOPRAM OXALATE 10 MG/1
10 TABLET ORAL DAILY
COMMUNITY
Start: 2023-08-08 | End: 2024-01-03

## 2023-08-22 RX ORDER — LEVOTHYROXINE SODIUM 112 MCG
112 TABLET ORAL DAILY
Qty: 90 TABLET | Refills: 3 | Status: SHIPPED | OUTPATIENT
Start: 2023-08-22 | End: 2023-09-01

## 2023-08-22 NOTE — TELEPHONE ENCOUNTER
Spoke with pt and scheduled her to come in today for a thyroid med check.  Still has her cpx scheduled for 9/8/23.

## 2023-08-22 NOTE — NURSING NOTE
Chief Complaint   Patient presents with    Recheck Medication     Thyroid med check         Pre-visit Screening:  Immunizations:  Not applicable  Colonoscopy:  is up to date  Mammogram: is due and to be scheduled by patient for later completion  Asthma Action Test/Plan:  NA  PHQ9:  NA  GAD7:  NA  Questioned patient about current smoking habits Pt. quit smoking some time ago.  Ok to leave detailed message on voice mail for today's visit only Yes, phone # [phone]

## 2023-08-22 NOTE — PROGRESS NOTES
"CC: Medication Check    History:  Hypothyroid:  Takes Synthroid 112 mcg, as she does not tolerate a generic. Feels like this working well without side effects. Has been taking consistently.     PMH, MEDICATIONS, ALLERGIES, SOCIAL AND FAMILY HISTORY in EPIC and reviewed by me personally.    ROS negative other than the symptoms noted above in the HPI.    Examination   /60 (BP Location: Right arm, Patient Position: Sitting, Cuff Size: Adult Large)   Pulse 50   Temp 98.2  F (36.8  C) (Temporal)   Ht 1.74 m (5' 8.5\")   Wt 82.1 kg (181 lb)   LMP 06/14/2008   SpO2 98%   BMI 27.12 kg/m       Constitutional: Sitting comfortably, in no acute distress. Vital signs noted  Neck:  no adenopathy, trachea midline and normal to palpation  Cardiovascular:  regular rate and rhythm, no murmurs, clicks, or gallops  Respiratory:  normal respiratory rate and rhythm, lungs clear to auscultationSKIN: No jaundice/pallor/rash.   Psychiatric: mentation appears normal and affect normal/bright      A/P    ICD-10-CM    1. Acquired hypothyroidism  E03.9 VENOUS COLLECTION     TSH WITH FREE T4 REFLEX ([facility])          DISCUSSION:  Hypothyroid:  Will recheck TSH and refill current dose of levothyroxine for 1 year, unless dose change is indicated.     follow up visit: 1 year, could do with 2024 physical, and could extend 30 days if needed to be due for physical. Doing 2023 px 9/8/2023.    Sherlyn Funk PA-C  [facility]    "

## 2023-08-23 LAB — TSH SERPL-ACNC: 1.79 MIU/L (ref 0.4–4.5)

## 2023-09-01 DIAGNOSIS — E03.9 ACQUIRED HYPOTHYROIDISM: Primary | ICD-10-CM

## 2023-09-01 RX ORDER — LEVOTHYROXINE SODIUM 112 UG/1
112 TABLET ORAL DAILY
Qty: 90 TABLET | Refills: 3 | Status: SHIPPED | OUTPATIENT
Start: 2023-09-01 | End: 2024-08-28

## 2023-09-01 NOTE — TELEPHONE ENCOUNTER
Pt called stating her insurance has changed, she now needs her thyroid medication sent as the generic levothyroxine 112MCG and needs it to go to [facility]. She is out so hoping it can be sent soon.    Kristal Nicole is requesting a refill of:    Pending Prescriptions:                       Disp   Refills    levothyroxine (SYNTHROID/LEVOTHROID) 112 *90 tab*3            Sig: Take 1 tablet (112 mcg) by mouth daily

## 2023-09-08 ENCOUNTER — OFFICE VISIT (OUTPATIENT)
Dept: FAMILY MEDICINE | Facility: CLINIC | Age: 58
End: 2023-09-08

## 2023-09-08 VITALS
HEART RATE: 52 BPM | HEIGHT: 69 IN | DIASTOLIC BLOOD PRESSURE: 70 MMHG | RESPIRATION RATE: 20 BRPM | BODY MASS INDEX: 26.66 KG/M2 | TEMPERATURE: 97.5 F | WEIGHT: 180 LBS | SYSTOLIC BLOOD PRESSURE: 112 MMHG

## 2023-09-08 DIAGNOSIS — Z13.0 SCREENING FOR BLOOD DISEASE: ICD-10-CM

## 2023-09-08 DIAGNOSIS — D68.51 FACTOR V LEIDEN MUTATION (H): ICD-10-CM

## 2023-09-08 DIAGNOSIS — Z00.00 ENCOUNTER FOR GENERAL HEALTH EXAMINATION: Primary | ICD-10-CM

## 2023-09-08 DIAGNOSIS — Z13.220 SCREENING FOR LIPID DISORDERS: ICD-10-CM

## 2023-09-08 DIAGNOSIS — Z13.228 SCREENING FOR METABOLIC DISORDER: ICD-10-CM

## 2023-09-08 LAB
BUN SERPL-MCNC: 14 MG/DL (ref 7–25)
BUN/CREATININE RATIO: 17.3 (ref 6–32)
CALCIUM SERPL-MCNC: 9.2 MG/DL (ref 8.6–10.3)
CHLORIDE SERPLBLD-SCNC: 100.8 MMOL/L (ref 98–110)
CHOLEST SERPL-MCNC: 244 MG/DL (ref 0–199)
CHOLEST/HDLC SERPL: 3 {RATIO} (ref 0–5)
CO2 SERPL-SCNC: 32.2 MMOL/L (ref 20–32)
CREAT SERPL-MCNC: 0.81 MG/DL (ref 0.6–1.3)
ERYTHROCYTE [DISTWIDTH] IN BLOOD BY AUTOMATED COUNT: 11.6 %
GLUCOSE SERPL-MCNC: 104 MG/DL (ref 60–99)
HCT VFR BLD AUTO: 45.7 % (ref 35–47)
HDLC SERPL-MCNC: 82 MG/DL (ref 40–150)
HEMOGLOBIN: 14.5 G/DL (ref 11.7–15.7)
LDLC SERPL CALC-MCNC: 151 MG/DL (ref 0–130)
MCH RBC QN AUTO: 31.7 PG (ref 26–33)
MCHC RBC AUTO-ENTMCNC: 31.7 G/DL (ref 31–36)
MCV RBC AUTO: 99.9 FL (ref 78–100)
PLATELET COUNT - QUEST: 209 10^9/L (ref 150–375)
POTASSIUM SERPL-SCNC: 4.35 MMOL/L (ref 3.5–5.3)
RBC # BLD AUTO: 4.57 10*12/L (ref 3.8–5.2)
SODIUM SERPL-SCNC: 139.2 MMOL/L (ref 135–146)
TRIGL SERPL-MCNC: 53 MG/DL (ref 0–149)
WBC # BLD AUTO: 4.5 10*9/L (ref 4–11)

## 2023-09-08 PROCEDURE — 85027 COMPLETE CBC AUTOMATED: CPT | Performed by: PHYSICIAN ASSISTANT

## 2023-09-08 PROCEDURE — 99396 PREV VISIT EST AGE 40-64: CPT | Performed by: PHYSICIAN ASSISTANT

## 2023-09-08 PROCEDURE — 80061 LIPID PANEL: CPT | Performed by: PHYSICIAN ASSISTANT

## 2023-09-08 PROCEDURE — 80048 BASIC METABOLIC PNL TOTAL CA: CPT | Performed by: PHYSICIAN ASSISTANT

## 2023-09-08 PROCEDURE — 36415 COLL VENOUS BLD VENIPUNCTURE: CPT | Performed by: PHYSICIAN ASSISTANT

## 2023-09-08 NOTE — PROGRESS NOTES
Chief Complaint:  Physical Exam    SUBJECTIVE:   Kristal Nicole is a 58 year old female presents for routine health maintenance.    Current concerns: None    Menses are absent    Patient's last menstrual period was 2008.    Was last Pap smear normal: Yes  Due for mammogram:  No    Body mass index is 26.97 kg/m .    Present exercise habits:  5-7 times/week  Present dietary habits:  eats regular meals and follows a balanced nutrition diet    Calcium intake:  diet plus supplement  Vit D intake: is taking supplement    Is the patient a smoker? No  If yes, smoking cessation advised and counseling provided.     Cardiovascular risk factors: none    Over the past few weeks, have you felt down or depressed? Little interest or pleasure in doing things? No concerns. Takes escitalopram for hot flashes for the past month.     If in a relationship are there any Domestic violence concern: No    Last dental appointment:  this year  Last optical appointment:  last year    Was the patient born between [birth year] and has not had Hep C testing?  Has not been tested, declines testing    I have reviewed the following histories: Past Medical History, Past Surgical History, Social History, Family History, Problem List, Medication List and Allergies    Past Medical History:   Diagnosis Date    NONSPECIFIC MEDICAL HISTORY     , given up for adoption     Family History   Problem Relation Age of Onset    Breast Cancer Mother 56    Cardiovascular Mother          blood clot leg after prolonged bedrest    Osteoarthritis Mother         hip    Cardiovascular Father         blood clots, legs    Skin Cancer Father     Osteoarthritis Father     Deep Vein Thrombosis Brother     Factor V Leiden deficiency Brother     Heart Failure Maternal Grandmother     Lung Cancer Maternal Grandfather     Cancer Paternal Grandfather         unknown type metastatic    Colon Cancer No family hx of      Social History     Socioeconomic History     Marital status:      Spouse name: Dhaval    Number of children: 0    Years of education: Not on file    Highest education level: Not on file   Occupational History     Employer: [employer]   Tobacco Use    Smoking status: Former     Packs/day: 0.75     Years: 10.00     Pack years: 7.50     Types: Cigarettes     Quit date: 2000     Years since quittin.3     Passive exposure: Past    Smokeless tobacco: Never   Substance and Sexual Activity    Alcohol use: Yes     Alcohol/week: 3.0 standard drinks of alcohol     Types: 3 Standard drinks or equivalent per week    Drug use: No    Sexual activity: Yes     Partners: Male     Birth control/protection: Post-menopausal   Other Topics Concern     Service No    Blood Transfusions No    Caffeine Concern Yes     Comment: few cups/day coffee and tea    Occupational Exposure Not Asked    Hobby Hazards Not Asked    Sleep Concern Not Asked    Stress Concern Not Asked    Weight Concern Not Asked    Special Diet No     Comment: calcium daily, monitoring, dairy per day 2-3    Back Care Not Asked    Exercise Yes     Comment: swim, pilates, walking, 3day walk training    Bike Helmet No    Seat Belt Yes    Self-Exams Yes   Social History Narrative    Not on file     Social Determinants of Health     Financial Resource Strain: Not on file   Food Insecurity: Not on file   Transportation Needs: Not on file   Physical Activity: Not on file   Stress: Not on file   Social Connections: Not on file   Intimate Partner Violence: Not on file   Housing Stability: Not on file     Past Surgical History:   Procedure Laterality Date    D & C      miscarriage       ROS:  E/M: NEGATIVE for ear, nose, mouth and throat problems  R: NEGATIVE for significant/chronic cough or SOB  CV: NEGATIVE for chest pain or palpitations  GI: NEGATIVE for abdominal pain, chronic diarrhea or constipation  :  NEGATIVE for dysuria, hematuria or vaginal discharge. No sexual health concerns.  "      Current Outpatient Medications   Medication    aspirin (ASA) 81 MG EC tablet    escitalopram (LEXAPRO) 10 MG tablet    fish oil-omega-3 fatty acids 1000 MG capsule    levothyroxine (SYNTHROID/LEVOTHROID) 112 MCG tablet    Vitamin D3 (CHOLECALCIFEROL) 25 mcg (1000 units) tablet    vitamin E (TOCOPHEROL) 400 units (180 mg) capsule    multivitamin w/minerals (MULTI-VITAMIN) tablet     No current facility-administered medications for this visit.       Patient Active Problem List    Diagnosis Date Noted    History of colonic polyps - repeat colonoscopy 2022 06/12/2018     Priority: Medium    Health Care Home 05/31/2018     Priority: Medium    Acquired hypothyroidism 05/31/2018     Priority: Medium    Factor V Leiden mutation (H) homozygous 05/31/2018     Priority: Medium    CARDIOVASCULAR SCREENING; LDL GOAL LESS THAN 160 10/31/2010     Priority: Medium         OBJECTIVE:  /70 (BP Location: Right arm, Patient Position: Chair, Cuff Size: Adult Regular)   Pulse 52   Temp 97.5  F (36.4  C) (Temporal)   Resp 20   Ht 1.74 m (5' 8.5\")   Wt 81.6 kg (180 lb)   LMP 06/14/2008   BMI 26.97 kg/m          General: 58 year old female who appears her stated age. Vital signs noted.  Head: Normocephalic  Eyes: pupils equal round reactive to light and accomodation, extra ocular movements intact  Ears: external canals and TMs free of abnormalities  Nose: patent, without mucosal abnormalities  Mouth and throat: without erythema or lesions of the mucosa  Neck: supple, without adenopathy or thyromegaly  Lungs: clear to auscultation, no wheezing or crackles  Breasts: Goes to OBGYN  Cv: regular rate and rhythm, normal s1 and s2 without murmur or click  Abd: soft, non-tender, no masses, no hepatomegaly or splenomegaly.   (female): Goes to OBGYN  Ms: normal muscle tone & symmetry  Skin: clear to inspection and with no palpable abnormalities.  Neuro: sensation and motor function grossly intact; cranial nerves without obvious " "abnormalities.    ASSESSMENT/PLAN:    Encounter for general health examination  Kristal is doing well today. Will update fasting labs and send MyChart.     Screening for metabolic disorder  - VENOUS COLLECTION  - Basic Metabolic Panel ([facility])    Screening for lipid disorders  - VENOUS COLLECTION  - Lipid Panel ([facility])    Screening for blood disease  - VENOUS COLLECTION  - Hemogram Platelet ([facility])    Factor V Leiden mutation (H) homozygous  Continue monitoring for signs of clotting. No concerns today.      reports that she quit smoking about 23 years ago. Her smoking use included cigarettes. She has a 7.50 pack-year smoking history. She has been exposed to tobacco smoke. She has never used smokeless tobacco.    Estimated body mass index is 26.97 kg/m  as calculated from the following:    Height as of this encounter: 1.74 m (5' 8.5\").    Weight as of this encounter: 81.6 kg (180 lb).  Weight management plan: Discussed healthy diet and exercise guidelines      Labs pending:      Fasting glucose      Fasting lipids  Meds Suggested:      Vitamin D       Calcium  Tests Recommended:      Regular Dental Examinations        Eye exam  Behavior Modifications:       Cardiovascular exercise 3 times per week--enough to get your Target Heart rate  Other recommendations:     BMI noted and discussed      Regular breast exam     Encouraged My Chart    Counseling Resources:  ATP IV Guidelines  Pooled Cohorts Equation Calculator  Breast Cancer Risk Calculator  FRAX Risk Assessment  ICSI Preventive Guidelines  Dietary Guidelines for Americans, 2010  USDA's MyPlate            Sherlyn Funk PA-C  9/8/2023    "

## 2023-09-08 NOTE — NURSING NOTE
Kristal Nicole is here for a CPX.    Pre-visit planning  Immunizations -up to date  Colonoscopy -is up to date  Mammogram -is due and ordered today  Asthma test --  PHQ9 -  EDUARDO 7 -      Questioned patient about current smoking habits.  Pt. quit smoking some time ago.  Body mass index is 26.97 kg/m .  PULSE regular  My Chart: active  CLASSIFICATION OF OVERWEIGHT AND OBESITY BY BMI                        Obesity Class           BMI(kg/m2)  Underweight                                    < 18.5  Normal                                         18.5-24.9  Overweight                                     25.0-29.9  OBESITY                     I                  30.0-34.9                             II                 35.0-39.9  EXTREME OBESITY             III                >40                            Patient's  BMI Body mass index is 26.97 kg/m .      The patient has verbalized that it is ok to leave a detailed voice message on the patient's cell phone with results/recommendations from this visit.

## 2023-11-09 ENCOUNTER — HOSPITAL ENCOUNTER (EMERGENCY)
Facility: CLINIC | Age: 58
Discharge: HOME | End: 2023-11-09
Attending: EMERGENCY MEDICINE | Admitting: EMERGENCY MEDICINE
Payer: COMMERCIAL

## 2023-11-09 ENCOUNTER — PATIENT OUTREACH (OUTPATIENT)
Dept: GASTROENTEROLOGY | Facility: CLINIC | Age: 58
End: 2023-11-09
Payer: COMMERCIAL

## 2023-11-09 ENCOUNTER — APPOINTMENT (OUTPATIENT)
Dept: ULTRASOUND IMAGING | Facility: CLINIC | Age: 58
End: 2023-11-09
Attending: EMERGENCY MEDICINE
Payer: COMMERCIAL

## 2023-11-09 VITALS
SYSTOLIC BLOOD PRESSURE: 138 MMHG | WEIGHT: 185 LBS | HEART RATE: 61 BPM | OXYGEN SATURATION: 100 % | DIASTOLIC BLOOD PRESSURE: 69 MMHG | BODY MASS INDEX: 26.48 KG/M2 | HEIGHT: 70 IN | RESPIRATION RATE: 18 BRPM | TEMPERATURE: 97.7 F

## 2023-11-09 DIAGNOSIS — M66.0 RUPTURED BAKERS CYST: ICD-10-CM

## 2023-11-09 PROCEDURE — 99284 EMERGENCY DEPT VISIT MOD MDM: CPT | Mod: 25

## 2023-11-09 PROCEDURE — 93971 EXTREMITY STUDY: CPT | Mod: LT

## 2023-11-09 NOTE — ED TRIAGE NOTES
Pt arrives ambulatory for left calf pain that began Sunday or Monday. Pt has history of blood clots in the family (none herself) and double gene for lidens factor 5. Takes thyroid meds and an antianxiety med for hot flashes daily. Reports left calf swelling but does not note warmth to the touch.

## 2023-11-09 NOTE — ED PROVIDER NOTES
"    History     Chief Complaint:  Leg Swelling       HPI   Kristal Nicole is a 58 year old female presents with left calf pain concerned about DVT.  She does not have a history of DVT but his history of factor V.  She notes that she does some running in.  Intermittently has pain to that left knee.  This past week she has been in meetings quite extensively and noticed the swelling and wanted to be seen that its not a DVT.      Independent Historian:    Patient    Review of External Notes:  N/A    Medications:    aspirin (ASA) 81 MG EC tablet  escitalopram (LEXAPRO) 10 MG tablet  fish oil-omega-3 fatty acids 1000 MG capsule  levothyroxine (SYNTHROID/LEVOTHROID) 112 MCG tablet  multivitamin w/minerals (MULTI-VITAMIN) tablet  Vitamin D3 (CHOLECALCIFEROL) 25 mcg (1000 units) tablet  vitamin E (TOCOPHEROL) 400 units (180 mg) capsule        Past Medical History:    Past Medical History:   Diagnosis Date    NONSPECIFIC MEDICAL HISTORY 1986       Past Surgical History:    Past Surgical History:   Procedure Laterality Date    D & C  6/09    miscarriage          Physical Exam   Patient Vitals for the past 24 hrs:   BP Temp Temp src Pulse Resp SpO2 Height Weight   11/09/23 1416 138/69 97.7  F (36.5  C) Temporal 61 18 100 % 1.765 m (5' 9.5\") 83.9 kg (185 lb)        Physical Exam  GENERAL: well developed, pleasant  HEAD: atraumatic  EYES: pupils reactive, extraocular muscles intact, conjunctivae normal  ENT:  mucus membranes moist  NECK:  trachea midline, normal range of motion  RESPIRATORY: No tachypnea  CVS: Well-perfused skin  ABDOMEN: No distention  MUSCULOSKELETAL: Slight pain noted to the upper posterior calf no bruising or no erythema or warmth  SKIN: warm and dry, no acute rashes or ulceration  NEURO: GCS 15, cranial nerves intact, alert and oriented x3  PSYCH:  Mood/affect normal      Emergency Department Course       Imaging:  US Lower Extremity Venous Duplex Left   Final Result   IMPRESSION:   1.  No deep venous " thrombosis in the left lower extremity.   2.  Somewhat complex fluid collections in the popliteal fossa as   above, differential includes ruptured Baker's cyst and hematoma.      DULCE RENNER MD            SYSTEM ID:  [id number]        Report per radiology    Laboratory:  Labs Ordered and Resulted from Time of ED Arrival to Time of ED Departure - No data to display     Procedures       Emergency Department Course & Assessments:             Interventions:  Medications - No data to display     Assessments:      Independent Interpretation (X-rays, CTs, rhythm strip):  None    Consultations/Discussion of Management or Tests:  None       Social Determinants of Health affecting care:  N/A     Disposition:  The patient was discharged to home.     Impression & Plan    CMS Diagnoses: None          Medical Decision Making:    Patient presents with calf tenderness and concerns for DVT given risk factors.  Ultrasound was negative for DVT but shows likely ruptured Baker's cyst versus hematoma.  She describes some intermittent pain and swelling to that knee with running and has been in sitting in meetings.  Discussed with her treatment for Baker's cyst.  Discussed indications for return.    Critical Care time:  was 0 minutes for this patient excluding procedures.    Diagnosis:    ICD-10-CM    1. Ruptured Bakers cyst  M66.0            Discharge Medications:  New Prescriptions    No medications on file          Dio Connolly MD  11/9/2023   Dio Connolly MD Adams, Shaun L, MD  11/09/23 8791

## 2024-01-03 ENCOUNTER — OFFICE VISIT (OUTPATIENT)
Dept: FAMILY MEDICINE | Facility: CLINIC | Age: 59
End: 2024-01-03

## 2024-01-03 VITALS
OXYGEN SATURATION: 99 % | DIASTOLIC BLOOD PRESSURE: 64 MMHG | BODY MASS INDEX: 26.92 KG/M2 | SYSTOLIC BLOOD PRESSURE: 110 MMHG | TEMPERATURE: 97.5 F | WEIGHT: 188 LBS | HEART RATE: 67 BPM | HEIGHT: 70 IN

## 2024-01-03 DIAGNOSIS — H65.191 OTHER NON-RECURRENT ACUTE NONSUPPURATIVE OTITIS MEDIA OF RIGHT EAR: Primary | ICD-10-CM

## 2024-01-03 PROCEDURE — 99213 OFFICE O/P EST LOW 20 MIN: CPT | Performed by: PHYSICIAN ASSISTANT

## 2024-01-03 RX ORDER — ECHINACEA PURPUREA EXTRACT 125 MG
1 TABLET ORAL DAILY PRN
COMMUNITY
Start: 2023-12-27 | End: 2024-08-28

## 2024-01-03 RX ORDER — AZITHROMYCIN 250 MG/1
TABLET, FILM COATED ORAL
Qty: 6 TABLET | Refills: 0 | Status: SHIPPED | OUTPATIENT
Start: 2024-01-03 | End: 2024-08-28

## 2024-01-03 RX ORDER — AZITHROMYCIN 500 MG/1
1 TABLET, FILM COATED ORAL
COMMUNITY
Start: 2023-12-27 | End: 2024-01-03

## 2024-01-03 NOTE — NURSING NOTE
Chief Complaint   Patient presents with    Ear Problem     Went to [facility] a week ago and had right ear infection, symptoms started 3 weeks ago with a cold  Thinks she also had a sinus infection at that time  Did three days of azithromycin 500 mg which helped but feels that the pain is coming back    Menopausal Sx     Pt started escitalopram in Aug but stopped taking in Oct before going on a cruise/was gaining weight. Hot flashes are becoming a problem again and wondering if she should restart medication or next steps- perhaps contact OBGYN.         Pre-visit Screening:  Immunizations:  not up to date - Shingrix, flu another time  Colonoscopy:  is up to date  Mammogram: is up to date  Asthma Action Test/Plan:  NA  PHQ9:  NA  GAD7:  NA  Questioned patient about current smoking habits Pt. has never smoked.  Ok to leave detailed message on voice mail for today's visit only Yes, phone # [phone]

## 2024-01-03 NOTE — PROGRESS NOTES
"CC: Recheck ear    History:  Pt developed symptoms 3 weeks ago including cough, sore throat, hoarse voice, runny nose. Then 10 days ago developed right ear pain. [facility] and was prescribed azithromycin 500 mg 3 days. This did seem to help with symptoms including the ear other than some ongoing plugged sensation. She contacted [facility] with this update and they said this can be normal, and did try a decongestant, but feels like pain and plugged is getting worse.     Pt's 12 year old son did have a ear infection recently.     PMH, MEDICATIONS, ALLERGIES, SOCIAL AND FAMILY HISTORY in [facility] and reviewed by me personally.    ROS negative other than the symptoms noted above in the HPI.      Examination   /64 (BP Location: Right arm, Patient Position: Sitting, Cuff Size: Adult Large)   Pulse 67   Temp 97.5  F (36.4  C) (Temporal)   Ht 1.765 m (5' 9.5\")   Wt 85.3 kg (188 lb)   LMP 06/14/2008   SpO2 99%   BMI 27.36 kg/m       Constitutional: Sitting comfortably, in no acute distress. Vital signs noted  Eyes: pupils equal round reactive to light and accomodation, extra ocular movements intact  Ears: left: external canal and TM free of abnormalities. Right: external canal free of abnormalities. TM with mild erythema, and notable retraction. No suppuration. No rupture of TM.   Nose: patent, without mucosal abnormalities  Mouth and throat: without erythema or lesions of the mucosa  Neck:  no adenopathy, trachea midline and normal to palpation, thyroid normal to palpation  Cardiovascular:  regular rate and rhythm, no murmurs, clicks, or gallops  Respiratory:  normal respiratory rate and rhythm, lungs clear to auscultation  SKIN: No jaundice/pallor/rash.   Psychiatric: mentation appears normal and affect normal/bright    A/P    ICD-10-CM    1. Other non-recurrent acute nonsuppurative otitis media of right ear  H65.191 azithromycin (ZITHROMAX) 250 MG tablet          DISCUSSION:  AOM:  Given " ongoing appearance of AOM worsening after initial improvement from 3 days of azithromycin, recommended additional course of azithromycin (zpack) as well as Sudafed to help with likely eustachian tube dysfunction. Contact me in 1 week if not significantly better, or sooner with worsening. Would like her to meet with ENT, and would try to get her in within 1-2 weeks, especially with any worsening of hearing.     Hot flashes:  Briefly discussed as part of this unrelated visit. Unable to do HRT due to Factor V Leiden. Recommended restart escitalopram to confirm causing weight gain, and follow-up with OBGYN to consider other options. Would need separate, focused appt for our clinic to take over on this.     follow up visit: As needed    [facility]

## 2024-03-29 ENCOUNTER — TRANSFERRED RECORDS (OUTPATIENT)
Dept: FAMILY MEDICINE | Facility: CLINIC | Age: 59
End: 2024-03-29

## 2024-06-17 PROBLEM — Z76.89 HEALTH CARE HOME: Status: RESOLVED | Noted: 2018-05-31 | Resolved: 2024-06-17

## 2024-08-10 ENCOUNTER — HEALTH MAINTENANCE LETTER (OUTPATIENT)
Age: 59
End: 2024-08-10

## 2024-08-26 NOTE — PROGRESS NOTES
Preventive Care Visit  [facility]  Virgen Griffiths MD, Family Medicine  Aug 28, 2024       SUBJECTIVE:   Kristal is a 59 year old, presenting for the following:  Physical (Fasting today, no concerns) and Recheck Medication (Refill levothyroxine)      HPI      Here for a physical.   Needs a pap. Is fasting. Due for labs and refills on her thyroid.  Also wondering about hot flashes. She usually sees gynecology at [facility]. They put her on lexapro but this caused weight gain, so she weaned off of it in July. Losing her insurance a the end of this month.              Social History     Tobacco Use    Smoking status: Former     Current packs/day: 0.00     Average packs/day: 0.8 packs/day for 10.0 years (7.5 ttl pk-yrs)     Types: Cigarettes     Start date: 1990     Quit date: 2000     Years since quittin.3     Passive exposure: Past    Smokeless tobacco: Never   Substance Use Topics    Alcohol use: Yes     Alcohol/week: 3.0 standard drinks of alcohol     Types: 3 Standard drinks or equivalent per week              No data to display            No concerns  Reviewed orders with patient.  Reviewed health maintenance and updated orders accordingly - Yes  BP Readings from Last 3 Encounters:   24 116/72   24 110/64   23 138/69    Wt Readings from Last 3 Encounters:   24 86.2 kg (190 lb)   24 85.3 kg (188 lb)   23 83.9 kg (185 lb)                  Patient Active Problem List   Diagnosis    Acquired hypothyroidism    Factor V Leiden mutation (H) homozygous    History of colonic polyps - repeat colonoscopy      Past Surgical History:   Procedure Laterality Date    D & C      miscarriage       Social History     Tobacco Use    Smoking status: Former     Current packs/day: 0.00     Average packs/day: 0.8 packs/day for 10.0 years (7.5 ttl pk-yrs)     Types: Cigarettes     Start date: 1990     Quit date: 2000     Years since quittin.3      Passive exposure: Past    Smokeless tobacco: Never   Substance Use Topics    Alcohol use: Yes     Alcohol/week: 3.0 standard drinks of alcohol     Types: 3 Standard drinks or equivalent per week     Family History   Problem Relation Age of Onset    Breast Cancer Mother 56    Cardiovascular Mother          blood clot leg after prolonged bedrest    Osteoarthritis Mother         hip    Cardiovascular Father         blood clots, legs    Skin Cancer Father     Osteoarthritis Father     Deep Vein Thrombosis Brother     Factor V Leiden deficiency Brother     Heart Failure Maternal Grandmother     Lung Cancer Maternal Grandfather     Cancer Paternal Grandfather         unknown type metastatic    Colon Cancer No family hx of          Current Outpatient Medications   Medication Sig Dispense Refill    levothyroxine (SYNTHROID/LEVOTHROID) 112 MCG tablet Take 1 tablet (112 mcg) by mouth daily. 90 tablet 3       Breast Cancer Screening:    FHS-7:       7/5/2022     9:04 AM   Breast CA Risk Assessment (FHS-7)   Did any of your first-degree relatives have breast or ovarian cancer? Yes   Did any of your relatives have bilateral breast cancer? No   Did any man in your family have breast cancer? No   Did any woman in your family have breast and ovarian cancer? No   Did any woman in your family have breast cancer before age 50 y? No   Do you have 2 or more relatives with breast and/or ovarian cancer? No   Do you have 2 or more relatives with breast and/or bowel cancer? No     Advised to schedule mammogram    Pertinent mammograms are reviewed under the imaging tab.      History of abnormal Pap smear: due today        5/8/2007    12:00 AM   PAP / HPV   PAP (Historical) NIL            5/8/2007    12:00 AM   PAP / HPV   PAP (Historical) NIL      Reviewed and updated as needed this visit by clinical staff   Tobacco  Allergies  Meds  Problems             Reviewed and updated as needed this visit by Provider                  Past Medical  "History:   Diagnosis Date    NONSPECIFIC MEDICAL HISTORY     , given up for adoption      Past Surgical History:   Procedure Laterality Date    D & C      miscarriage     Review of Systems    Review of Systems  Constitutional, HEENT, cardiovascular, pulmonary, gi and gu systems are negative, except as otherwise noted.    OBJECTIVE:   /72 (BP Location: Right arm, Patient Position: Sitting, Cuff Size: Adult Large)   Pulse 59   Temp 97.8  F (36.6  C) (Temporal)   Ht 1.765 m (5' 9.5\")   Wt 86.2 kg (190 lb)   LMP 2008   SpO2 99%   BMI 27.66 kg/m     Estimated body mass index is 27.66 kg/m  as calculated from the following:    Height as of this encounter: 1.765 m (5' 9.5\").    Weight as of this encounter: 86.2 kg (190 lb).  Physical Exam  GENERAL: alert and no distress  EYES: Eyes grossly normal to inspection, PERRL and conjunctivae and sclerae normal  HENT: ear canals and TM's normal, nose and mouth without ulcers or lesions  NECK: no adenopathy, no asymmetry, masses, or scars  RESP: lungs clear to auscultation - no rales, rhonchi or wheezes  BREAST: normal without masses, tenderness or nipple discharge and no palpable axillary masses or adenopathy  CV: regular rate and rhythm, normal S1 S2, no S3 or S4, no murmur, click or rub, no peripheral edema  ABDOMEN: soft, nontender, no hepatosplenomegaly, no masses and bowel sounds normal   (female) w/bimanual: normal female external genitalia, normal urethral meatus, normal vaginal mucosa, and normal cervix/adnexa/uterus without masses or discharge  MS: no gross musculoskeletal defects noted, no edema  SKIN: no suspicious lesions or rashes  NEURO: Normal strength and tone, mentation intact and speech normal  PSYCH: mentation appears normal, affect normal/bright    Diagnostic Test Results:  Labs reviewed in Epic  No results found for any visits on 24.    ASSESSMENT/PLAN:   1. Encounter for routine history and physical exam in female " "patient    - Basic Metabolic Panel (BFP)  - Lipid Panel (BFP)    2. Acquired hypothyroidism  Check labs, refilled medications.  - levothyroxine (SYNTHROID/LEVOTHROID) 112 MCG tablet; Take 1 tablet (112 mcg) by mouth daily.  Dispense: 90 tablet; Refill: 3  - VENOUS COLLECTION  - TSH WITH FREE T4 REFLEX ([facility])    3. Screening for cervical cancer    - THINPREP TIS PAP AND HPV mRNA E6/E7 ([facility])  - [place] PELVIC EXAMINATION    4. Encounter for screening mammogram for breast cancer    - MA Screening Bilateral w/ [place]; Future  - Radiology Referral (Affiliate Use Only)    5. Menopausal syndrome (hot flashes)  Hot flashes, I advised to followup with gynecology for additional discussion and evaluation.     Patient has been advised of split billing requirements and indicates understanding: Yes      Counseling  Reviewed preventive health counseling, as reflected in patient instructions       Regular exercise       Healthy diet/nutrition      BMI  Estimated body mass index is 27.66 kg/m  as calculated from the following:    Height as of this encounter: 1.765 m (5' 9.5\").    Weight as of this encounter: 86.2 kg (190 lb).         She reports that she quit smoking about 24 years ago. Her smoking use included cigarettes. She started smoking about 34 years ago. She has a 7.5 pack-year smoking history. She has been exposed to tobacco smoke. She has never used smokeless tobacco.          Signed Electronically by: Virgen Griffiths MD  "

## 2024-08-28 ENCOUNTER — OFFICE VISIT (OUTPATIENT)
Dept: FAMILY MEDICINE | Facility: CLINIC | Age: 59
End: 2024-08-28

## 2024-08-28 VITALS
HEIGHT: 70 IN | HEART RATE: 59 BPM | BODY MASS INDEX: 27.2 KG/M2 | DIASTOLIC BLOOD PRESSURE: 72 MMHG | WEIGHT: 190 LBS | OXYGEN SATURATION: 99 % | TEMPERATURE: 97.8 F | SYSTOLIC BLOOD PRESSURE: 116 MMHG

## 2024-08-28 DIAGNOSIS — Z00.00 ENCOUNTER FOR ROUTINE HISTORY AND PHYSICAL EXAM IN FEMALE PATIENT: Primary | ICD-10-CM

## 2024-08-28 DIAGNOSIS — Z12.4 SCREENING FOR CERVICAL CANCER: ICD-10-CM

## 2024-08-28 DIAGNOSIS — N95.1 MENOPAUSAL SYNDROME (HOT FLASHES): ICD-10-CM

## 2024-08-28 DIAGNOSIS — Z12.31 ENCOUNTER FOR SCREENING MAMMOGRAM FOR BREAST CANCER: ICD-10-CM

## 2024-08-28 DIAGNOSIS — E03.9 ACQUIRED HYPOTHYROIDISM: ICD-10-CM

## 2024-08-28 LAB
BUN SERPL-MCNC: 15 MG/DL (ref 7–25)
BUN/CREATININE RATIO: 19 (ref 6–32)
CALCIUM SERPL-MCNC: 9.6 MG/DL (ref 8.6–10.3)
CHLORIDE SERPLBLD-SCNC: 101.8 MMOL/L (ref 98–110)
CHOLEST SERPL-MCNC: 255 MG/DL (ref 0–199)
CHOLEST/HDLC SERPL: 3 {RATIO} (ref 0–5)
CO2 SERPL-SCNC: 30.5 MMOL/L (ref 20–32)
CREAT SERPL-MCNC: 0.8 MG/DL (ref 0.6–1.3)
GLUCOSE SERPL-MCNC: 96 MG/DL (ref 60–99)
HDLC SERPL-MCNC: 82 MG/DL (ref 40–150)
LDLC SERPL CALC-MCNC: 159 MG/DL
POTASSIUM SERPL-SCNC: 4.11 MMOL/L (ref 3.5–5.3)
SODIUM SERPL-SCNC: 139.7 MMOL/L (ref 135–146)
TRIGL SERPL-MCNC: 70 MG/DL (ref 0–149)

## 2024-08-28 PROCEDURE — 80061 LIPID PANEL: CPT | Performed by: FAMILY MEDICINE

## 2024-08-28 PROCEDURE — 36415 COLL VENOUS BLD VENIPUNCTURE: CPT | Performed by: FAMILY MEDICINE

## 2024-08-28 PROCEDURE — 80048 BASIC METABOLIC PNL TOTAL CA: CPT | Performed by: FAMILY MEDICINE

## 2024-08-28 PROCEDURE — 99459 PELVIC EXAMINATION: CPT | Performed by: FAMILY MEDICINE

## 2024-08-28 PROCEDURE — 99396 PREV VISIT EST AGE 40-64: CPT | Performed by: FAMILY MEDICINE

## 2024-08-28 RX ORDER — LEVOTHYROXINE SODIUM 112 UG/1
112 TABLET ORAL DAILY
Qty: 90 TABLET | Refills: 3 | Status: SHIPPED | OUTPATIENT
Start: 2024-08-28

## 2024-08-28 NOTE — NURSING NOTE
Chief Complaint   Patient presents with    Physical     Fasting today, no concerns    Recheck Medication     Refill levothyroxine     Pre-visit Screening:  Immunizations:  up to date  Colonoscopy:  is up to date  Mammogram: is due and ordered today  Asthma Action Test/Plan:  NA  PHQ9:  NA  GAD7:  NA  Questioned patient about current smoking habits Pt. quit smoking some time ago.  Ok to leave detailed message on voice mail for today's visit only Yes, phone # [phone]

## 2024-08-29 ENCOUNTER — TRANSFERRED RECORDS (OUTPATIENT)
Dept: FAMILY MEDICINE | Facility: CLINIC | Age: 59
End: 2024-08-29

## 2024-08-29 LAB — TSH SERPL-ACNC: 1.94 MIU/L (ref 0.4–4.5)

## 2024-08-30 LAB
CLINICAL HISTORY - QUEST: NORMAL
COMMENT - QUEST: NORMAL
COMMENT - QUEST: NORMAL
CYTOTECHNOLOGIST - QUEST: NORMAL
DESCRIPTIVE DIAGNOSIS - QUEST: NORMAL
HPV MRNA E6/E7: NOT DETECTED
LAST PAP DX - QUEST: NORMAL
LMP - QUEST: NORMAL
PREV BX DX - QUEST: NORMAL
SOURCE: NORMAL
STATEMENT OF ADEQUACY - QUEST: NORMAL

## 2025-07-30 ENCOUNTER — OFFICE VISIT (OUTPATIENT)
Dept: FAMILY MEDICINE | Facility: CLINIC | Age: 60
End: 2025-07-30

## 2025-07-30 VITALS
OXYGEN SATURATION: 100 % | SYSTOLIC BLOOD PRESSURE: 112 MMHG | BODY MASS INDEX: 28.44 KG/M2 | HEART RATE: 66 BPM | HEIGHT: 69 IN | WEIGHT: 192 LBS | TEMPERATURE: 97.8 F | DIASTOLIC BLOOD PRESSURE: 70 MMHG

## 2025-07-30 DIAGNOSIS — E78.5 HYPERLIPIDEMIA LDL GOAL <130: ICD-10-CM

## 2025-07-30 DIAGNOSIS — D68.51 FACTOR V LEIDEN MUTATION: ICD-10-CM

## 2025-07-30 DIAGNOSIS — N95.2 ATROPHIC VAGINITIS: ICD-10-CM

## 2025-07-30 DIAGNOSIS — Z01.419 ENCOUNTER FOR GYNECOLOGICAL EXAMINATION WITHOUT ABNORMAL FINDING: Primary | ICD-10-CM

## 2025-07-30 DIAGNOSIS — E03.9 ACQUIRED HYPOTHYROIDISM: ICD-10-CM

## 2025-07-30 DIAGNOSIS — Z12.11 SPECIAL SCREENING FOR MALIGNANT NEOPLASMS, COLON: ICD-10-CM

## 2025-07-30 DIAGNOSIS — Z13.228 SCREENING FOR METABOLIC DISORDER: ICD-10-CM

## 2025-07-30 LAB
BUN SERPL-MCNC: 21 MG/DL (ref 7–25)
BUN/CREATININE RATIO: 24 (ref 6–32)
CALCIUM SERPL-MCNC: 9.6 MG/DL (ref 8.6–10.3)
CHLORIDE SERPLBLD-SCNC: 101.1 MMOL/L (ref 98–110)
CHOLEST SERPL-MCNC: 242 MG/DL (ref 0–199)
CHOLEST/HDLC SERPL: 3 {RATIO} (ref 0–5)
CO2 SERPL-SCNC: 30.1 MMOL/L (ref 20–32)
CREAT SERPL-MCNC: 0.88 MG/DL (ref 0.6–1.3)
GLUCOSE SERPL-MCNC: 103 MG/DL (ref 60–99)
HDLC SERPL-MCNC: 83 MG/DL (ref 40–150)
LDLC SERPL CALC-MCNC: 147 MG/DL (ref 0–129)
POTASSIUM SERPL-SCNC: 4.64 MMOL/L (ref 3.5–5.3)
SODIUM SERPL-SCNC: 138.6 MMOL/L (ref 135–146)
TRIGL SERPL-MCNC: 60 MG/DL (ref 0–149)

## 2025-07-30 PROCEDURE — 99459 PELVIC EXAMINATION: CPT | Performed by: PHYSICIAN ASSISTANT

## 2025-07-30 PROCEDURE — 90677 PCV20 VACCINE IM: CPT | Performed by: PHYSICIAN ASSISTANT

## 2025-07-30 PROCEDURE — 99396 PREV VISIT EST AGE 40-64: CPT | Mod: 25 | Performed by: PHYSICIAN ASSISTANT

## 2025-07-30 PROCEDURE — 36415 COLL VENOUS BLD VENIPUNCTURE: CPT | Performed by: PHYSICIAN ASSISTANT

## 2025-07-30 PROCEDURE — 84443 ASSAY THYROID STIM HORMONE: CPT | Mod: 90 | Performed by: PHYSICIAN ASSISTANT

## 2025-07-30 PROCEDURE — 80048 BASIC METABOLIC PNL TOTAL CA: CPT | Performed by: PHYSICIAN ASSISTANT

## 2025-07-30 PROCEDURE — 80061 LIPID PANEL: CPT | Performed by: PHYSICIAN ASSISTANT

## 2025-07-30 PROCEDURE — 90471 IMMUNIZATION ADMIN: CPT | Performed by: PHYSICIAN ASSISTANT

## 2025-07-30 RX ORDER — LEVOTHYROXINE SODIUM 112 UG/1
112 TABLET ORAL DAILY
Qty: 90 TABLET | Refills: 3 | Status: SHIPPED | OUTPATIENT
Start: 2025-07-30

## 2025-07-30 RX ORDER — FAMOTIDINE 20 MG
TABLET ORAL
COMMUNITY
Start: 2025-07-30

## 2025-07-30 NOTE — NURSING NOTE
Chief Complaint   Patient presents with    Physical     CPX fasting and thyroid med check      Pre-visit Screening:  Immunizations:  not up to date - will do prevnar  Colonoscopy:  is due and ordered today  Mammogram: is due and to be scheduled by patient for later completion  Asthma Action Test/Plan:  na  PHQ9:  phq2 given  GAD7:  na  Questioned patient about current smoking habits Pt. quit smoking some time ago.  Ok to leave detailed message on voice mail for today's visit only yes, phone # [phone] (home) [phone] (work)

## 2025-07-30 NOTE — PROGRESS NOTES
Chief Complaint:  Physical Exam    SUBJECTIVE:   Kristal Nicole is a 59 year old female presents for routine health maintenance.    Current concerns:   Hypothyroid:  Stable on current dose of levothyroxine 112 mcg daily. Denies any symptoms of being over or under supplemented. Last thyroid blood tests 2024 and were normal without change. Takes medication first thing in the morning with glass of water 30-60 minutes before eating.    Menses are absent    Patient's last menstrual period was 2008.    Was last Pap smear normal: Yes.  Due for mammogram:  Yes    Body mass index is 28.35 kg/m .     Present exercise habits:  3-5 times/week  Present dietary habits:  eats regular meals and follows a balanced nutrition diet    Calcium intake:  2-3 servings daily  Vit D intake: is taking supplement    Is the patient a smoker? No  If yes, smoking cessation advised and counseling provided.     Cardiovascular risk factors: none    Over the past few weeks, have you felt down or depressed? Little interest or pleasure in doing things? No concerns    If in a relationship are there any Domestic violence concern: No    Last dental appointment:  this year  Last optical appointment:  this year    Was the patient born between [birth year] and has not had Hep C testing?  Has not been tested, declines testing    I have reviewed the following histories: Past Medical History, Past Surgical History, Social History, Family History, Problem List, Medication List and Allergies    Past Medical History:   Diagnosis Date    NONSPECIFIC MEDICAL HISTORY     , given up for adoption     Family History   Problem Relation Age of Onset    Breast Cancer Mother 56    Deep Vein Thrombosis Mother         Leiden V    Osteoarthritis Mother         hip    Deep Vein Thrombosis Father         Leiden V    Skin Cancer Father     Osteoarthritis Father     Deep Vein Thrombosis Brother     Factor V Leiden deficiency Brother     Heart Failure Maternal  Grandmother     Lung Cancer Maternal Grandfather     Cancer Paternal Grandfather         unknown type metastatic    Colon Cancer No family hx of      Social History     Socioeconomic History    Marital status:      Spouse name: Dhaval    Number of children: 0    Years of education: Not on file    Highest education level: Not on file   Occupational History     Employer: [employer]   Tobacco Use    Smoking status: Former     Current packs/day: 0.00     Average packs/day: 0.8 packs/day for 10.0 years (7.5 ttl pk-yrs)     Types: Cigarettes     Start date: 1990     Quit date: 2000     Years since quittin.2     Passive exposure: Past    Smokeless tobacco: Never   Substance and Sexual Activity    Alcohol use: Yes     Alcohol/week: 10.0 standard drinks of alcohol     Types: 10 Standard drinks or equivalent per week    Drug use: No    Sexual activity: Yes     Partners: Male     Birth control/protection: Post-menopausal   Other Topics Concern     Service No    Blood Transfusions No    Caffeine Concern Yes     Comment: few cups/day coffee and tea    Occupational Exposure Not Asked    Hobby Hazards Not Asked    Sleep Concern Not Asked    Stress Concern Not Asked    Weight Concern Not Asked    Special Diet No     Comment: calcium daily, monitoring, dairy per day 2-3    Back Care Not Asked    Exercise Yes     Comment: swim, pilates, walking, 3day walk training    Bike Helmet No    Seat Belt Yes    Self-Exams Yes   Social History Narrative    Not on file     Social Drivers of Health     Financial Resource Strain: Not on file   Food Insecurity: Not on file   Transportation Needs: Not on file   Physical Activity: Not on file   Stress: Not on file   Social Connections: Not on file   Interpersonal Safety: Not on file   Housing Stability: Not on file     Past Surgical History:   Procedure Laterality Date    D & C      miscarriage       ROS:  E/M: NEGATIVE for ear, nose, mouth and throat problems  R:  "NEGATIVE for significant/chronic cough or SOB  CV: NEGATIVE for chest pain or palpitations  GI: NEGATIVE for abdominal pain, chronic diarrhea or constipation  :  NEGATIVE for dysuria, hematuria or vaginal discharge. No sexual health concerns.       Current Outpatient Medications   Medication Sig Dispense Refill    levothyroxine (SYNTHROID/LEVOTHROID) 112 MCG tablet Take 1 tablet (112 mcg) by mouth daily. 90 tablet 3    Vitamin D, Cholecalciferol, 25 MCG (1000 UT) CAPS Take by mouth.       No current facility-administered medications for this visit.       Patient Active Problem List    Diagnosis Date Noted    Hyperlipidemia LDL goal <130 07/30/2025     Priority: Medium    Atrophic vaginitis 07/30/2025     Priority: Medium    History of colonic polyps - repeat colonoscopy 2022 06/12/2018     Priority: Medium    Acquired hypothyroidism 05/31/2018     Priority: Medium    Factor V Leiden mutation (H) homozygous 05/31/2018     Priority: Medium       OBJECTIVE:  /70 (BP Location: Left arm, Patient Position: Sitting, Cuff Size: Adult Regular)   Pulse 66   Temp 97.8  F (36.6  C) (Temporal)   Ht 1.753 m (5' 9\")   Wt 87.1 kg (192 lb)   LMP 06/14/2008   SpO2 100%   BMI 28.35 kg/m        General: 59 year old female who appears her stated age. Vital signs noted  Head: Normocephalic  Eyes: pupils equal round reactive to light and accomodation, extra ocular movements intact  Ears: external canals and TMs free of abnormalities  Nose: patent, without mucosal abnormalities  Mouth and throat: without erythema or lesions of the mucosa  Neck: supple, without adenopathy or thyromegaly  Lungs: clear to auscultation, no wheezing or crackles  Breasts: skin without rash, no dominant mass, no nipple discharge, or axillary adenopathy  Cv: regular rate and rhythm, normal s1 and s2 without murmur or click  Abd: soft, non-tender, no masses, no hepatomegaly or splenomegaly.   (female): normal female external genitalia, normal " "urethral meatus, vaginal mucosa, normal cervix/adnexa/uterus without masses or discharge. Atrophic changes noted.  Ms: normal muscle tone & symmetry  Skin: clear to inspection and with no palpable abnormalities.  Neuro: sensation and motor function grossly intact; cranial nerves without obvious abnormalities.    ASSESSMENT/PLAN:    Encounter for gynecological examination without abnormal finding  Kristal is doing relatively well today. Will update fasting labs, TSH today and send MyChart.  Discussed diet in general and ways to make small changes to improve diet, and hopefully lead to weight loss. Recommended taking 2-3 days to track protein in grams, and carbohydrates in grams. Also encouraged incorporating weight resistance training which she admits she could do better on. She may look into insurance coverage for GLP-1/GIP, and return for appt if wanting to pursue.     Acquired hypothyroidism  - levothyroxine (SYNTHROID/LEVOTHROID) 112 MCG tablet; Take 1 tablet (112 mcg) by mouth daily.  - TSH WITH FREE T4 REFLEX ([facility])    Screening for metabolic disorder  - VENOUS COLLECTION  - Basic Metabolic Panel ([facility])    Hyperlipidemia LDL goal <130  - VENOUS COLLECTION  - Lipid Panel ([facility])    Factor V Leiden mutation (H) homozygous    Special screening for malignant neoplasms, colon  - Colonoscopy Screening  Referral    Atrophic vaginitis  Did see atrophic changes on exam. Recommended trial of replens or Reveree, but I would send in topical estrogen if pt requests as this would be considered safe even with her factor V leiden.      reports that she quit smoking about 25 years ago. Her smoking use included cigarettes. She started smoking about 35 years ago. She has a 7.5 pack-year smoking history. She has been exposed to tobacco smoke. She has never used smokeless tobacco.    Estimated body mass index is 28.35 kg/m  as calculated from the following:    Height as of this encounter: 1.753 m (5' 9\").    Weight as of this " encounter: 87.1 kg (192 lb).  Weight management plan: Discussed healthy diet and exercise guidelines    Labs pending:      Fasting glucose      Fasting lipids       Pap smear  Meds Suggested:      Vitamin D       Calcium  Tests Recommended:      Regular Dental Examinations       Colonoscopy due, .patient  will schedule       Eye exam        Mammogram yearly  Behavior Modifications:       Cardiovascular exercise 3 times per week--enough to get your Target Heart rate  Immunizations suggested:       Prevnar 20  Other recommendations:     BMI noted and discussed      Regular breast exam     Encouraged My Chart    Counseling Resources:  ATP IV Guidelines  Pooled Cohorts Equation Calculator  Breast Cancer Risk Calculator  FRAX Risk Assessment  ICSI Preventive Guidelines  Dietary Guidelines for Americans, 2010  USDA's MyPlate    Sherlyn Funk PA-C  7/30/2025

## 2025-07-31 LAB — TSH SERPL-ACNC: 2.76 MIU/L (ref 0.4–4.5)

## 2025-08-16 ENCOUNTER — HEALTH MAINTENANCE LETTER (OUTPATIENT)
Age: 60
End: 2025-08-16

## 2025-08-25 ENCOUNTER — OFFICE VISIT (OUTPATIENT)
Dept: FAMILY MEDICINE | Facility: CLINIC | Age: 60
End: 2025-08-25

## 2025-08-25 VITALS
TEMPERATURE: 97.8 F | SYSTOLIC BLOOD PRESSURE: 114 MMHG | HEART RATE: 63 BPM | OXYGEN SATURATION: 98 % | DIASTOLIC BLOOD PRESSURE: 72 MMHG
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
## 2025-08-25 DIAGNOSIS — S61.219D LACERATION OF FINGER OF RIGHT HAND WITHOUT FOREIGN BODY WITHOUT DAMAGE TO NAIL, UNSPECIFIED FINGER, SUBSEQUENT ENCOUNTER: ICD-10-CM

## 2025-08-25 PROCEDURE — 86706 HEP B SURFACE ANTIBODY: CPT | Mod: 90 | Performed by: FAMILY MEDICINE

## 2025-08-25 PROCEDURE — 36415 COLL VENOUS BLD VENIPUNCTURE: CPT | Performed by: FAMILY MEDICINE

## 2025-08-25 PROCEDURE — 87389 HIV-1 AG W/HIV-1&-2 AB AG IA: CPT | Mod: 90 | Performed by: FAMILY MEDICINE

## 2025-08-25 PROCEDURE — G2211 COMPLEX E/M VISIT ADD ON: HCPCS | Performed by: FAMILY MEDICINE

## 2025-08-25 PROCEDURE — 87340 HEPATITIS B SURFACE AG IA: CPT | Mod: 90 | Performed by: FAMILY MEDICINE

## 2025-08-25 PROCEDURE — 86803 HEPATITIS C AB TEST: CPT | Mod: 90 | Performed by: FAMILY MEDICINE

## 2025-08-25 PROCEDURE — 99213 OFFICE O/P EST LOW 20 MIN: CPT | Performed by: FAMILY MEDICINE

## 2025-08-27 LAB
HB S AB - QUEST: NORMAL
HB S AG - QUEST: NORMAL
HCV AB - QUEST: NORMAL
HIV 1/2 AGN ABY 4TH GEN WITH REFLEX: NORMAL
HIV1 AB SERPL QL IA: NORMAL
HIV1+2 AB SERPL QL IA: NORMAL
Lab: NORMAL